# Patient Record
Sex: FEMALE | Race: WHITE | NOT HISPANIC OR LATINO | Employment: UNEMPLOYED | URBAN - METROPOLITAN AREA
[De-identification: names, ages, dates, MRNs, and addresses within clinical notes are randomized per-mention and may not be internally consistent; named-entity substitution may affect disease eponyms.]

---

## 2018-07-01 ENCOUNTER — HOSPITAL ENCOUNTER (OUTPATIENT)
Facility: MEDICAL CENTER | Age: 53
End: 2018-07-02
Attending: EMERGENCY MEDICINE | Admitting: INTERNAL MEDICINE
Payer: MEDICAID

## 2018-07-01 ENCOUNTER — APPOINTMENT (OUTPATIENT)
Dept: RADIOLOGY | Facility: MEDICAL CENTER | Age: 53
End: 2018-07-01
Attending: EMERGENCY MEDICINE
Payer: MEDICAID

## 2018-07-01 DIAGNOSIS — R40.4 ALTERED LEVEL OF CONSCIOUSNESS: ICD-10-CM

## 2018-07-01 LAB
ALBUMIN SERPL BCP-MCNC: 4 G/DL (ref 3.2–4.9)
ALBUMIN/GLOB SERPL: 1.1 G/DL
ALP SERPL-CCNC: 71 U/L (ref 30–99)
ALT SERPL-CCNC: 20 U/L (ref 2–50)
AMPHETAMINES UR QL: NEGATIVE
ANION GAP SERPL CALC-SCNC: 9 MMOL/L (ref 0–11.9)
APPEARANCE UR: CLEAR
AST SERPL-CCNC: 33 U/L (ref 12–45)
BACTERIA #/AREA URNS HPF: ABNORMAL /HPF
BARBITURATES UR QL SCN: NEGATIVE
BASOPHILS # BLD AUTO: 0.7 % (ref 0–1.8)
BASOPHILS # BLD: 0.07 K/UL (ref 0–0.12)
BENZODIAZ UR QL SCN: NEGATIVE
BILIRUB SERPL-MCNC: 0.5 MG/DL (ref 0.1–1.5)
BILIRUB UR QL STRIP.AUTO: NEGATIVE
BUN SERPL-MCNC: 20 MG/DL (ref 8–22)
BZE UR QL SCN: NEGATIVE
CALCIUM SERPL-MCNC: 9.1 MG/DL (ref 8.4–10.2)
CHLORIDE SERPL-SCNC: 108 MMOL/L (ref 96–112)
CHOLEST SERPL-MCNC: 207 MG/DL (ref 100–199)
CO2 SERPL-SCNC: 21 MMOL/L (ref 20–33)
COLOR UR: YELLOW
CREAT SERPL-MCNC: 1.12 MG/DL (ref 0.5–1.4)
EOSINOPHIL # BLD AUTO: 0.09 K/UL (ref 0–0.51)
EOSINOPHIL NFR BLD: 0.8 % (ref 0–6.9)
EPI CELLS #/AREA URNS HPF: ABNORMAL /HPF
ERYTHROCYTE [DISTWIDTH] IN BLOOD BY AUTOMATED COUNT: 41 FL (ref 35.9–50)
ETHANOL BLD-MCNC: 0.01 G/DL
GLOBULIN SER CALC-MCNC: 3.5 G/DL (ref 1.9–3.5)
GLUCOSE SERPL-MCNC: 140 MG/DL (ref 65–99)
GLUCOSE UR STRIP.AUTO-MCNC: NEGATIVE MG/DL
HCT VFR BLD AUTO: 41 % (ref 37–47)
HDLC SERPL-MCNC: 44 MG/DL
HGB BLD-MCNC: 13.8 G/DL (ref 12–16)
IMM GRANULOCYTES # BLD AUTO: 0.03 K/UL (ref 0–0.11)
IMM GRANULOCYTES NFR BLD AUTO: 0.3 % (ref 0–0.9)
KETONES UR STRIP.AUTO-MCNC: NEGATIVE MG/DL
LDLC SERPL CALC-MCNC: 139 MG/DL
LEUKOCYTE ESTERASE UR QL STRIP.AUTO: NEGATIVE
LYMPHOCYTES # BLD AUTO: 3.47 K/UL (ref 1–4.8)
LYMPHOCYTES NFR BLD: 32.3 % (ref 22–41)
MAGNESIUM SERPL-MCNC: 2.2 MG/DL (ref 1.5–2.5)
MCH RBC QN AUTO: 29.1 PG (ref 27–33)
MCHC RBC AUTO-ENTMCNC: 33.7 G/DL (ref 33.6–35)
MCV RBC AUTO: 86.3 FL (ref 81.4–97.8)
MICRO URNS: ABNORMAL
MONOCYTES # BLD AUTO: 0.68 K/UL (ref 0–0.85)
MONOCYTES NFR BLD AUTO: 6.3 % (ref 0–13.4)
NEUTROPHILS # BLD AUTO: 6.4 K/UL (ref 2–7.15)
NEUTROPHILS NFR BLD: 59.6 % (ref 44–72)
NITRITE UR QL STRIP.AUTO: NEGATIVE
NRBC # BLD AUTO: 0 K/UL
NRBC BLD-RTO: 0 /100 WBC
PCP UR QL SCN: NEGATIVE
PH UR STRIP.AUTO: 5.5 [PH]
PLATELET # BLD AUTO: 311 K/UL (ref 164–446)
PMV BLD AUTO: 10.2 FL (ref 9–12.9)
POTASSIUM SERPL-SCNC: 3.9 MMOL/L (ref 3.6–5.5)
PROT SERPL-MCNC: 7.5 G/DL (ref 6–8.2)
PROT UR QL STRIP: 100 MG/DL
RBC # BLD AUTO: 4.75 M/UL (ref 4.2–5.4)
RBC # URNS HPF: ABNORMAL /HPF
RBC UR QL AUTO: ABNORMAL
SODIUM SERPL-SCNC: 138 MMOL/L (ref 135–145)
SP GR UR REFRACTOMETRY: 1.03
TRIGL SERPL-MCNC: 118 MG/DL (ref 0–149)
TROPONIN I SERPL-MCNC: <0.02 NG/ML (ref 0–0.04)
UR OPIATES 2659: NEGATIVE
UR THC 2511T: NEGATIVE
UR TRICYCLIC 2660: NEGATIVE
WBC # BLD AUTO: 10.7 K/UL (ref 4.8–10.8)
WBC #/AREA URNS HPF: ABNORMAL /HPF

## 2018-07-01 PROCEDURE — 99220 PR INITIAL OBSERVATION CARE,LEVL III: CPT | Performed by: INTERNAL MEDICINE

## 2018-07-01 PROCEDURE — 83735 ASSAY OF MAGNESIUM: CPT

## 2018-07-01 PROCEDURE — 80061 LIPID PANEL: CPT

## 2018-07-01 PROCEDURE — G0378 HOSPITAL OBSERVATION PER HR: HCPCS

## 2018-07-01 PROCEDURE — 81001 URINALYSIS AUTO W/SCOPE: CPT | Mod: XU

## 2018-07-01 PROCEDURE — 70450 CT HEAD/BRAIN W/O DYE: CPT

## 2018-07-01 PROCEDURE — 80307 DRUG TEST PRSMV CHEM ANLYZR: CPT

## 2018-07-01 PROCEDURE — 80305 DRUG TEST PRSMV DIR OPT OBS: CPT

## 2018-07-01 PROCEDURE — 80053 COMPREHEN METABOLIC PANEL: CPT

## 2018-07-01 PROCEDURE — 99285 EMERGENCY DEPT VISIT HI MDM: CPT

## 2018-07-01 PROCEDURE — 700102 HCHG RX REV CODE 250 W/ 637 OVERRIDE(OP): Performed by: INTERNAL MEDICINE

## 2018-07-01 PROCEDURE — 85025 COMPLETE CBC W/AUTO DIFF WBC: CPT

## 2018-07-01 PROCEDURE — 83036 HEMOGLOBIN GLYCOSYLATED A1C: CPT

## 2018-07-01 PROCEDURE — 84443 ASSAY THYROID STIM HORMONE: CPT

## 2018-07-01 PROCEDURE — 71045 X-RAY EXAM CHEST 1 VIEW: CPT

## 2018-07-01 PROCEDURE — A9270 NON-COVERED ITEM OR SERVICE: HCPCS | Performed by: INTERNAL MEDICINE

## 2018-07-01 PROCEDURE — 84484 ASSAY OF TROPONIN QUANT: CPT

## 2018-07-01 RX ORDER — ASPIRIN 81 MG/1
324 TABLET, CHEWABLE ORAL DAILY
Status: DISCONTINUED | OUTPATIENT
Start: 2018-07-01 | End: 2018-07-03 | Stop reason: HOSPADM

## 2018-07-01 RX ORDER — AMOXICILLIN 250 MG
2 CAPSULE ORAL 2 TIMES DAILY
Status: DISCONTINUED | OUTPATIENT
Start: 2018-07-01 | End: 2018-07-03 | Stop reason: HOSPADM

## 2018-07-01 RX ORDER — ACETAMINOPHEN 325 MG/1
650 TABLET ORAL EVERY 6 HOURS PRN
Status: DISCONTINUED | OUTPATIENT
Start: 2018-07-01 | End: 2018-07-03 | Stop reason: HOSPADM

## 2018-07-01 RX ORDER — ASPIRIN 600 MG/1
300 SUPPOSITORY RECTAL DAILY
Status: DISCONTINUED | OUTPATIENT
Start: 2018-07-01 | End: 2018-07-03 | Stop reason: HOSPADM

## 2018-07-01 RX ORDER — BISACODYL 10 MG
10 SUPPOSITORY, RECTAL RECTAL
Status: DISCONTINUED | OUTPATIENT
Start: 2018-07-01 | End: 2018-07-03 | Stop reason: HOSPADM

## 2018-07-01 RX ORDER — POLYETHYLENE GLYCOL 3350 17 G/17G
1 POWDER, FOR SOLUTION ORAL
Status: DISCONTINUED | OUTPATIENT
Start: 2018-07-01 | End: 2018-07-03 | Stop reason: HOSPADM

## 2018-07-01 RX ORDER — PROMETHAZINE HYDROCHLORIDE 25 MG/1
12.5-25 TABLET ORAL EVERY 4 HOURS PRN
Status: DISCONTINUED | OUTPATIENT
Start: 2018-07-01 | End: 2018-07-02

## 2018-07-01 RX ORDER — PROMETHAZINE HYDROCHLORIDE 25 MG/1
12.5-25 SUPPOSITORY RECTAL EVERY 4 HOURS PRN
Status: DISCONTINUED | OUTPATIENT
Start: 2018-07-01 | End: 2018-07-02

## 2018-07-01 RX ORDER — ONDANSETRON 4 MG/1
4 TABLET, ORALLY DISINTEGRATING ORAL EVERY 4 HOURS PRN
Status: DISCONTINUED | OUTPATIENT
Start: 2018-07-01 | End: 2018-07-03 | Stop reason: HOSPADM

## 2018-07-01 RX ORDER — ONDANSETRON 2 MG/ML
4 INJECTION INTRAMUSCULAR; INTRAVENOUS EVERY 4 HOURS PRN
Status: DISCONTINUED | OUTPATIENT
Start: 2018-07-01 | End: 2018-07-03 | Stop reason: HOSPADM

## 2018-07-01 RX ORDER — ASPIRIN 325 MG
325 TABLET ORAL DAILY
Status: DISCONTINUED | OUTPATIENT
Start: 2018-07-01 | End: 2018-07-03 | Stop reason: HOSPADM

## 2018-07-01 RX ADMIN — ASPIRIN 325 MG ORAL TABLET 325 MG: 325 PILL ORAL at 23:53

## 2018-07-01 RX ADMIN — SENNOSIDES AND DOCUSATE SODIUM 2 TABLET: 8.6; 5 TABLET ORAL at 23:53

## 2018-07-01 ASSESSMENT — PAIN SCALES - GENERAL: PAINLEVEL_OUTOF10: 0

## 2018-07-02 ENCOUNTER — APPOINTMENT (OUTPATIENT)
Dept: RADIOLOGY | Facility: MEDICAL CENTER | Age: 53
End: 2018-07-02
Attending: INTERNAL MEDICINE
Payer: MEDICAID

## 2018-07-02 VITALS
RESPIRATION RATE: 18 BRPM | WEIGHT: 190.48 LBS | TEMPERATURE: 99.2 F | SYSTOLIC BLOOD PRESSURE: 153 MMHG | HEIGHT: 59 IN | HEART RATE: 78 BPM | BODY MASS INDEX: 38.4 KG/M2 | OXYGEN SATURATION: 93 % | DIASTOLIC BLOOD PRESSURE: 71 MMHG

## 2018-07-02 PROBLEM — E78.5 HYPERLIPIDEMIA: Status: ACTIVE | Noted: 2018-07-02

## 2018-07-02 PROBLEM — R46.2 BIZARRE BEHAVIOR: Status: ACTIVE | Noted: 2018-07-02

## 2018-07-02 PROBLEM — R51.9 HEADACHE: Status: ACTIVE | Noted: 2018-07-02

## 2018-07-02 PROBLEM — R73.9 HYPERGLYCEMIA: Status: ACTIVE | Noted: 2018-07-02

## 2018-07-02 PROBLEM — H91.92 HEARING LOSS OF LEFT EAR: Status: ACTIVE | Noted: 2018-07-02

## 2018-07-02 PROBLEM — F23 ACUTE PSYCHOSIS (HCC): Status: ACTIVE | Noted: 2018-07-02

## 2018-07-02 PROBLEM — E03.9 HYPOTHYROIDISM: Status: ACTIVE | Noted: 2018-07-02

## 2018-07-02 LAB
ERYTHROCYTE [SEDIMENTATION RATE] IN BLOOD BY WESTERGREN METHOD: 28 MM/HOUR (ref 0–30)
EST. AVERAGE GLUCOSE BLD GHB EST-MCNC: 126 MG/DL
HBA1C MFR BLD: 6 % (ref 0–5.6)
T4 FREE SERPL-MCNC: 0.92 NG/DL (ref 0.58–1.64)
TSH SERPL DL<=0.005 MIU/L-ACNC: 7.02 UIU/ML (ref 0.38–5.33)

## 2018-07-02 PROCEDURE — G0378 HOSPITAL OBSERVATION PER HR: HCPCS

## 2018-07-02 PROCEDURE — 700111 HCHG RX REV CODE 636 W/ 250 OVERRIDE (IP): Performed by: HOSPITALIST

## 2018-07-02 PROCEDURE — 700105 HCHG RX REV CODE 258: Performed by: HOSPITALIST

## 2018-07-02 PROCEDURE — A9270 NON-COVERED ITEM OR SERVICE: HCPCS | Performed by: HOSPITALIST

## 2018-07-02 PROCEDURE — 84439 ASSAY OF FREE THYROXINE: CPT

## 2018-07-02 PROCEDURE — 94760 N-INVAS EAR/PLS OXIMETRY 1: CPT

## 2018-07-02 PROCEDURE — 85652 RBC SED RATE AUTOMATED: CPT

## 2018-07-02 PROCEDURE — 99223 1ST HOSP IP/OBS HIGH 75: CPT | Performed by: HOSPITALIST

## 2018-07-02 PROCEDURE — 95951 EEG: CPT | Mod: 52

## 2018-07-02 PROCEDURE — 700102 HCHG RX REV CODE 250 W/ 637 OVERRIDE(OP): Performed by: HOSPITALIST

## 2018-07-02 PROCEDURE — 96365 THER/PROPH/DIAG IV INF INIT: CPT

## 2018-07-02 RX ORDER — AMOXICILLIN 250 MG
2 CAPSULE ORAL 2 TIMES DAILY
Status: CANCELLED | OUTPATIENT
Start: 2018-07-02

## 2018-07-02 RX ORDER — ONDANSETRON 4 MG/1
4 TABLET, ORALLY DISINTEGRATING ORAL EVERY 4 HOURS PRN
Status: CANCELLED | OUTPATIENT
Start: 2018-07-02

## 2018-07-02 RX ORDER — ONDANSETRON 2 MG/ML
4 INJECTION INTRAMUSCULAR; INTRAVENOUS EVERY 4 HOURS PRN
Status: CANCELLED | OUTPATIENT
Start: 2018-07-02

## 2018-07-02 RX ORDER — BISACODYL 10 MG
10 SUPPOSITORY, RECTAL RECTAL
Status: CANCELLED | OUTPATIENT
Start: 2018-07-02

## 2018-07-02 RX ORDER — QUETIAPINE FUMARATE 25 MG/1
25 TABLET, FILM COATED ORAL EVERY 8 HOURS
Status: DISCONTINUED | OUTPATIENT
Start: 2018-07-02 | End: 2018-07-02

## 2018-07-02 RX ORDER — ACETAMINOPHEN 325 MG/1
650 TABLET ORAL EVERY 6 HOURS PRN
Status: CANCELLED | OUTPATIENT
Start: 2018-07-02

## 2018-07-02 RX ORDER — POLYETHYLENE GLYCOL 3350 17 G/17G
1 POWDER, FOR SOLUTION ORAL
Status: CANCELLED | OUTPATIENT
Start: 2018-07-02

## 2018-07-02 RX ADMIN — QUETIAPINE FUMARATE 25 MG: 25 TABLET ORAL at 10:37

## 2018-07-02 RX ADMIN — ACYCLOVIR SODIUM 864 MG: 500 INJECTION, SOLUTION INTRAVENOUS at 17:49

## 2018-07-02 ASSESSMENT — LIFESTYLE VARIABLES
EVER_SMOKED: NEVER
ALCOHOL_USE: NO
EVER_SMOKED: NEVER

## 2018-07-02 ASSESSMENT — PAIN SCALES - WONG BAKER: WONGBAKER_NUMERICALRESPONSE: DOESN'T HURT AT ALL

## 2018-07-02 ASSESSMENT — COGNITIVE AND FUNCTIONAL STATUS - GENERAL
SUGGESTED CMS G CODE MODIFIER DAILY ACTIVITY: CH
SUGGESTED CMS G CODE MODIFIER MOBILITY: CH
DAILY ACTIVITIY SCORE: 24
MOBILITY SCORE: 24

## 2018-07-02 ASSESSMENT — PAIN SCALES - GENERAL
PAINLEVEL_OUTOF10: 0

## 2018-07-02 ASSESSMENT — PATIENT HEALTH QUESTIONNAIRE - PHQ9
2. FEELING DOWN, DEPRESSED, IRRITABLE, OR HOPELESS: NOT AT ALL
SUM OF ALL RESPONSES TO PHQ9 QUESTIONS 1 AND 2: 0
2. FEELING DOWN, DEPRESSED, IRRITABLE, OR HOPELESS: NOT AT ALL
SUM OF ALL RESPONSES TO PHQ9 QUESTIONS 1 AND 2: 0
SUM OF ALL RESPONSES TO PHQ9 QUESTIONS 1 AND 2: 0
1. LITTLE INTEREST OR PLEASURE IN DOING THINGS: NOT AT ALL
2. FEELING DOWN, DEPRESSED, IRRITABLE, OR HOPELESS: NOT AT ALL
1. LITTLE INTEREST OR PLEASURE IN DOING THINGS: NOT AT ALL
1. LITTLE INTEREST OR PLEASURE IN DOING THINGS: NOT AT ALL

## 2018-07-02 ASSESSMENT — ENCOUNTER SYMPTOMS
HALLUCINATIONS: 1
HEADACHES: 1

## 2018-07-02 NOTE — DISCHARGE PLANNING
Per MD, pt will need to transfer to [facility] for an MRI and LP under anesthesia.     SHAZIA obtained consent to transfer pt from pt's dtr, Nia.  She signed the COBRA form.     SHAZIA spoke to APOLINAR Quigley and she informed SW that there are no private beds on neuro.     SHAZIA sent PCS form to [facility] Chanda to place at will call.

## 2018-07-02 NOTE — H&P
Hospital Medicine History & Physical Note    Date of Service  7/2/2018    Primary Care Physician  No primary care provider on file.    Consultants  PSYCHIATRY    Code Status  FULL    Chief Complaint  none- patient is altered.     History of Presenting Illness  53 y.o. female who presented 7/1/2018 with acute confusion. Please see h/p from [facility] admission for more details. She has been intermittently confused and aggressive towards her family for the last week. Behaviors consist of a blank stare at times to very aggressive behavior towards her grandchild with comments that she is possessed wit demons. She has been telling family that she has snakes in her head and having visual and auditory hallucinations. She has apparently been reporting left sided hearing loss. She was admitted to [facility] and was unable to complete an MRI 2/2 severe combative behavior. She needds an MRI and an LP under anesthesia and kaitlin stringer transferred to [facility] for this. She was given one dose of seroquel and had marked sedation with just a 25mg dose thus this will be held for now. She will be seen by psychiatry as ordered but I am concerned that she has acute encephalitis. Empiric acyclovir has been started. Serum west nile has been ordered. She may need a neurology consultation and possible ID consultation depending on LP and MRI results.      Review of Systems  Review of Systems   Unable to perform ROS: Mental acuity       Past Medical History   has no past medical history on file.  Hypothyroid    Surgical History   has no past surgical history on file.     Family History  family history includes Lung Disease in her mother; Psychiatry in her mother.     Social History   reports that she has never smoked. She has never used smokeless tobacco. She reports that she does not drink alcohol or use drugs.  Confirmed with family that patient has not been using substances.     Allergies  No Known  Allergies    Medications  None   Synthroid      Physical Exam  Blood Pressure: 138/58   Temperature: 36.9 °C (98.4 °F)   Pulse: 88   Respiration: 18   Pulse Oximetry: 97 %     Physical Exam   Constitutional: She appears well-developed and well-nourished. No distress.   Patient seen and examined  Discussed plan with RN   HENT:   Right Ear: External ear normal.   Left Ear: External ear normal.   Nose: Nose normal.   Eyes: Conjunctivae are normal. Right eye exhibits no discharge. Left eye exhibits no discharge.   Neck: No JVD present.   Cardiovascular: Regular rhythm and normal heart sounds.    No murmur heard.  Cap refill 2sec  Pulses 2+ throughout     Pulmonary/Chest: Effort normal and breath sounds normal. No stridor. No respiratory distress. She has no wheezes. She has no rales.   Abdominal: Soft. Bowel sounds are normal. She exhibits no distension. There is no tenderness.   Musculoskeletal: She exhibits no edema or tenderness.   Neurological:   Somnolent at the time of my exam but when awakened she is combative and hallucinating. Unable to do a full neuro exam but does not appear to be focal.    Skin: Skin is warm and dry. She is not diaphoretic. No erythema.   Normal skin  Color.    Psychiatric: Her affect is labile and inappropriate. She is agitated, aggressive, hyperactive, actively hallucinating and combative. Cognition and memory are impaired. She expresses impulsivity and inappropriate judgment.   Hallucinating, combative.  She is inattentive.   Nursing note and vitals reviewed.      Laboratory:  Recent Labs      07/01/18 2013   WBC  10.7   RBC  4.75   HEMOGLOBIN  13.8   HEMATOCRIT  41.0   MCV  86.3   MCH  29.1   MCHC  33.7   RDW  41.0   PLATELETCT  311   MPV  10.2     Recent Labs      07/01/18 2013   SODIUM  138   POTASSIUM  3.9   CHLORIDE  108   CO2  21   GLUCOSE  140*   BUN  20   CREATININE  1.12   CALCIUM  9.1     Recent Labs      07/01/18 2013   ALTSGPT  20   ASTSGOT  33   ALKPHOSPHAT  71    TBILIRUBIN  0.5   GLUCOSE  140*             Recent Labs      07/01/18 2013   TRIGLYCERIDE  118   HDL  44   LDL  139*     Lab Results   Component Value Date    TROPONINI <0.02 07/01/2018       Urinalysis:    Lab Results   Component Value Date    SPECGRAVITY 1.028 07/01/2018    GLUCOSEUR Negative 07/01/2018    KETONES Negative 07/01/2018    NITRITE Negative 07/01/2018    WBCURINE 5-10 (A) 07/01/2018    RBCURINE  (A) 07/01/2018    BACTERIA Few (A) 07/01/2018    EPITHELCELL Few 07/01/2018        Imaging:  DX-CHEST-LIMITED (1 VIEW)   Final Result      No acute cardiopulmonary disease.      CT-HEAD W/O   Final Result      No evidence of acute intracranial process.      IR-CONSULT AND TREAT    (Results Pending)         Assessment/Plan:  I anticipate this patient will require at least two midnights for appropriate medical management, necessitating inpatient admission.    Acute psychosis- (present on admission)   Assessment & Plan    Concerning for acute encephalitis vs new acute psychosis,   Less likely stroke, brain mass and nonconvulsive seizures  Needs LP and MRI brain - transfer to [facility] hospital for these under anesthesia  EEG done here with pending results  Pending psych consult but need to rule out organic brain disease first  Tried one dose of seroquel and would avoid this as extremely sedating for patient.   Empiric acyclovir started  Serum [place] ordered          Hypothyroidism   Assessment & Plan    synthroid        Hyperglycemia   Assessment & Plan    Suspect reactive. Will trend.         Hyperlipidemia- (present on admission)   Assessment & Plan    Not on therapy.         Headache- (present on admission)   Assessment & Plan    Workup as above. Trend neuro exams.         Hearing loss of left ear- (present on admission)   Assessment & Plan    MRI brain and LP pending  Empiric acyclovir  Difficult to appreciate this on exam 2/2 delirium ;            VTE prophylaxis: heparin

## 2018-07-02 NOTE — PROGRESS NOTES
Breakfast provided for pt  Pt passed swallow eval per night nurse  Daughter at bedside helping to feed pt  Pt refusing to swallow food and holding orange juice in her mouth refusing to swallow

## 2018-07-02 NOTE — CARE PLAN
Problem: Safety  Goal: Will remain free from injury  Outcome: PROGRESSING AS EXPECTED  Check on Pt hourly. Pt encouraged to call for assistance as needed. Bed in low position, upper side rails up, anti slippery socks on, call light within reach, bed alarm on. Family in room with Pt.    Problem: Knowledge Deficit  Goal: Knowledge of disease process/condition, treatment plan, diagnostic tests, and medications will improve  Outcome: PROGRESSING AS EXPECTED  Discuss POC with Pt. Assess Pt's knowledge of disease process, treatment plan, diagnostic test, labs, and medications; and explain and give information as needed.

## 2018-07-02 NOTE — CARE PLAN
Problem: Safety  Goal: Will remain free from injury    Intervention: Provide assistance with mobility   07/02/18 1253   OTHER   Assistance / Tolerance Standby Assist;Assistance of One;Tolerates Well

## 2018-07-02 NOTE — ED NOTES
Pt given water per request.     .Pt prepared for transport to acute floor. Pt leaves ER in stable condition and with all belongings.

## 2018-07-02 NOTE — ASSESSMENT & PLAN NOTE
Concerning for acute encephalitis vs new acute psychosis,   Less likely stroke, brain mass and nonconvulsive seizures  Needs LP and MRI brain - transfer to main hospital for these under anesthesia  EEG done here with pending results  Pending psych consult but need to rule out organic brain disease first  Tried one dose of seroquel and would avoid this as extremely sedating for patient.   Empiric acyclovir started  Serum west nile ordered
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
## 2018-07-02 NOTE — ED NOTES
Pt's son Immanuel called requesting information be given. Not on file as emergency contact. Son notified to contact family members. Son yells and curses at RN and hangs up phone.

## 2018-07-02 NOTE — PROGRESS NOTES
Pt had a new transient episode of AMS. Pt had a blank stare for just a couple of minutes, did not answered questions at that time. As per daughter, Pt was lifting the legs up and down in a repetitive pattern. After those couple of minutes the Pt answered questions, she was A&O to self and place, can tell the year but not month or day. Will continue monitoring her. Safety measures in place.

## 2018-07-02 NOTE — PROGRESS NOTES
Direct admit from [facility], Dr. BARBARA Aguilar for ALOC, loss of memory, acute psychosis.  Discharge and readmit orders signed and held, need to be released upon pt arrival.  Pt coming by ground.

## 2018-07-02 NOTE — CARE PLAN
Problem: Communication  Goal: The ability to communicate needs accurately and effectively will improve    Intervention: Joffre patient and significant other/support system to call light to alert staff of needs   07/02/18 4050   OTHER   Oriented to: All of the Following : Location of Bathroom, Visiting Policy, Unit Routine, Call Light and Bedside Controls, Bedside Rail Policy, Smoking Policy, Rights and Responsibilities, Bedside Report, and Patient Education Notebook

## 2018-07-02 NOTE — DISCHARGE SUMMARY
Discharge Summary    CHIEF COMPLAINT ON ADMISSION  Chief Complaint   Patient presents with   • Memory Loss       Reason for Admission  Loss of Memory     CODE STATUS  Full Code    HPI & HOSPITAL COURSE  This is a 53 y.o. female here with acute alteration in mental status. Please see h/p for details. She needs an LP and MRI under anesthesia and will be transferred to [facility] for this. She will be seen by psychiatry there and may need a neurology consultation. She will be treated with empiric acyclovir for now.              Therefore, she is discharged in fair and stable condition to a short-term general hosptial for inpatient care.            FOLLOW UP ITEMS POST DISCHARGE  none    DISCHARGE DIAGNOSES  Active Problems:    Acute psychosis POA: Yes    Hearing loss of left ear POA: Yes    Headache POA: Yes    Hyperlipidemia POA: Yes    Hyperglycemia POA: Unknown    Hypothyroidism POA: Unknown  Resolved Problems:    * No resolved hospital problems. *      FOLLOW UP  No future appointments.  No follow-up provider specified.    MEDICATIONS ON DISCHARGE     Medication List      You have not been prescribed any medications.         Allergies  No Known Allergies    DIET  Orders Placed This Encounter   Procedures   • Diet Order Regular     Standing Status:   Standing     Number of Occurrences:   1     Order Specific Question:   Diet:     Answer:   Regular [1]       ACTIVITY  As tolerated.  Weight bearing as tolerated    LINES, DRAINS, AND WOUNDS  This is an automated list. Peripheral IVs will be removed prior to discharge.  PIV Group Right Hand 20g Flexible Catheter (Active)   Line Secured Taped;Transparent 7/2/2018 12:07 AM   Site Condition / Description Assessed;Patent;Clean;Dry;Intact 7/2/2018 12:07 AM   Dressing Type / Description Transparent;Clean;Dry;Intact 7/2/2018 12:07 AM   Dressing Status Observed 7/2/2018  9:00 AM   Saline Locked Yes 7/2/2018  9:00 AM   [facility]fil[facility] Grading [facility] 0  7/2/2018  9:00 AM   Phlebitis Scale (Used by [facility]) 0 7/2/2018  9:00 AM                     MENTAL STATUS ON TRANSFER  Level of Consciousness: Alert  Orientation : Oriented x 4  Speech: Speech Clear    CONSULTATIONS  psychiatry    PROCEDURES  none    LABORATORY  Lab Results   Component Value Date    SODIUM 138 07/01/2018    POTASSIUM 3.9 07/01/2018    CHLORIDE 108 07/01/2018    CO2 21 07/01/2018    GLUCOSE 140 (H) 07/01/2018    BUN 20 07/01/2018    CREATININE 1.12 07/01/2018        Lab Results   Component Value Date    WBC 10.7 07/01/2018    HEMOGLOBIN 13.8 07/01/2018    HEMATOCRIT 41.0 07/01/2018    PLATELETCT 311 07/01/2018        Total time of the discharge process exceeds 34 minutes.
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
## 2018-07-02 NOTE — PROGRESS NOTES
Admitted Pt from ER  via gursriram. Pt awake, A&O 4. Accompanied by vikas.  Transferred to bed and assessment performed. Oriented Pt to room and discussed POC. Initiated safety measures.

## 2018-07-02 NOTE — ED NOTES
"RN took pt to bathroom to collect urine sample. Pt back to bedroom and RN went to hook pt back up to monitor. Pt looked at RN with blank stare and would not answer any questions. RN went to label pt urine while still in room and pt was staring at RN with same blank stare. ERP to the bedside to examine pt. Pt gripping side rail tightly but would only stare at ERP. Pt non verbal. Pt son in law called out to her and she shook her head and began answering questions. RN took urine to lab and on way back to RN station pt left room and came at RN with \"blank stare.\" Pt redirected with saul voice and went back to room. RN consulted with ERP and decided to get security involved. Security to the bedside with RN to help attach pt back to monitor equipment.   "
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
## 2018-07-02 NOTE — ED PROVIDER NOTES
ED Provider Note    CHIEF COMPLAINT  Chief Complaint   Patient presents with   • Memory Loss       HPI  Vilma Foster is a 53 y.o. female here for evaluation of altered mentation.  The history was obtained from the son-in-law, who states that over the course of the last 2-3 weeks he has noticed that the patient has been confused, and intermittently aggressive.  It was reported that she was hugging her granddaughter, but continued to have her so tight that the parents had to then remove the patient's arms from the granddaughter.  The patient herself does not recall these types of events, but does become intermittently aggressive.  There is not been any type of trauma, chest pain, or shortness of breath.  This is very new behavior for this patient per the son-in-law, and there is no new medication changes or other factors.  Patient denies having any headache or any medical concerns at this time.  She does have a history of mental illness in the family.    PAST MEDICAL HISTORY   No diabetes  No hyperlipidemia    SOCIAL HISTORY  Social History     Social History Main Topics   • Smoking status: Never Smoker   • Smokeless tobacco: Never Used   • Alcohol use No   • Drug use: No   • Sexual activity: Not on file       SURGICAL HISTORY  patient denies any surgical history    CURRENT MEDICATIONS  Home Medications    **Home medications have not yet been reviewed for this encounter**         ALLERGIES  No Known Allergies    REVIEW OF SYSTEMS  See HPI for further details. Review of systems as above, otherwise all other systems are negative.     PHYSICAL EXAM  Constitutional: Well developed, well nourished.  Mild acute distress.  HEENT: atraumatic. Posterior pharynx clear and moist.  Eyes:  EOMI. Normal sclera.  Neck: Supple, Full range of motion, nontender.  Chest/Pulmonary: clear to ausculation. Symmetrical expansion.   Cardio: Regular rate and rhythm with no murmur.   Abdomen: Soft, nontender. No peritoneal signs. No  guarding. No palpable masses.  Back: No CVA tenderness, nontender midline, no step offs.  Musculoskeletal: No deformity, no edema, neurovascular intact.   Neuro: Clear speech, appropriate, cooperative, cranial nerves II-XII grossly intact.  Psych: Aggressive, and agitated intermittently.    Results for orders placed or performed during the hospital encounter of 07/01/18   CBC WITH DIFFERENTIAL   Result Value Ref Range    WBC 10.7 4.8 - 10.8 K/uL    RBC 4.75 4.20 - 5.40 M/uL    Hemoglobin 13.8 12.0 - 16.0 g/dL    Hematocrit 41.0 37.0 - 47.0 %    MCV 86.3 81.4 - 97.8 fL    MCH 29.1 27.0 - 33.0 pg    MCHC 33.7 33.6 - 35.0 g/dL    RDW 41.0 35.9 - 50.0 fL    Platelet Count 311 164 - 446 K/uL    MPV 10.2 9.0 - 12.9 fL    Neutrophils-Polys 59.60 44.00 - 72.00 %    Lymphocytes 32.30 22.00 - 41.00 %    Monocytes 6.30 0.00 - 13.40 %    Eosinophils 0.80 0.00 - 6.90 %    Basophils 0.70 0.00 - 1.80 %    Immature Granulocytes 0.30 0.00 - 0.90 %    Nucleated RBC 0.00 /100 WBC    Neutrophils (Absolute) 6.40 2.00 - 7.15 K/uL    Lymphs (Absolute) 3.47 1.00 - 4.80 K/uL    Monos (Absolute) 0.68 0.00 - 0.85 K/uL    Eos (Absolute) 0.09 0.00 - 0.51 K/uL    Baso (Absolute) 0.07 0.00 - 0.12 K/uL    Immature Granulocytes (abs) 0.03 0.00 - 0.11 K/uL    NRBC (Absolute) 0.00 K/uL   COMP METABOLIC PANEL   Result Value Ref Range    Sodium 138 135 - 145 mmol/L    Potassium 3.9 3.6 - 5.5 mmol/L    Chloride 108 96 - 112 mmol/L    Co2 21 20 - 33 mmol/L    Anion Gap 9.0 0.0 - 11.9    Glucose 140 (H) 65 - 99 mg/dL    Bun 20 8 - 22 mg/dL    Creatinine 1.12 0.50 - 1.40 mg/dL    Calcium 9.1 8.4 - 10.2 mg/dL    AST(SGOT) 33 12 - 45 U/L    ALT(SGPT) 20 2 - 50 U/L    Alkaline Phosphatase 71 30 - 99 U/L    Total Bilirubin 0.5 0.1 - 1.5 mg/dL    Albumin 4.0 3.2 - 4.9 g/dL    Total Protein 7.5 6.0 - 8.2 g/dL    Globulin 3.5 1.9 - 3.5 g/dL    A-G Ratio 1.1 g/dL   TROPONIN   Result Value Ref Range    Troponin I <0.02 0.00 - 0.04 ng/mL   UR DRUG SCREEN(SO [place]  ONLY)   Result Value Ref Range    Phencyclidine -Pcp Negative Negative    Benzodiazepines Negative Negative    Cocaine Metabolite Negative Negative    Amphetamines By Triage Negative Negative    Urine THC Negative Negative    Codeine-Morphine Negative Negative    Barbiturates Negative Negative    Tricyclic Antidepressants Negative Negative   DIAGNOSTIC ALCOHOL   Result Value Ref Range    Diagnostic Alcohol 0.01 (H) 0.00 g/dL   ESTIMATED GFR   Result Value Ref Range    GFR If African American >60 >60 mL/min/1.73 m 2    GFR If Non African American 50 (A) >60 mL/min/1.73 m 2   URINALYSIS,CULTURE IF INDICATED   Result Value Ref Range    Micro Urine Req Microscopic     Color Yellow     Character Clear     Ph 5.5 5.0 - 8.0    Glucose Negative Negative mg/dL    Ketones Negative Negative mg/dL    Protein 100 (A) Negative mg/dL    Bilirubin Negative Negative    Nitrite Negative Negative    Leukocyte Esterase Negative Negative    Occult Blood Large (A) Negative   REFRACTOMETER SG   Result Value Ref Range    Specific Gravity 1.028    URINE MICROSCOPIC (W/UA)   Result Value Ref Range    WBC 5-10 (A) /hpf    RBC  (A) /hpf    Bacteria Few (A) None /hpf    Epithelial Cells Few Few /hpf      DX-CHEST-LIMITED (1 VIEW)   Final Result      No acute cardiopulmonary disease.      CT-HEAD W/O   Final Result      No evidence of acute intracranial process.            PROCEDURES     MEDICAL RECORD  I have reviewed patient's medical record and pertinent results are listed above.    COURSE & MEDICAL DECISION MAKING  I have reviewed any medical record information, laboratory studies and radiographic results as noted above.    10:57 PM  The patient is nontoxic appearing, and afebrile.  However she has had intermittent episodes of agitation and aggressiveness, while being in the emergency department.  At this time I can find no cause for her altered mental status.  Im sure there is a psychiatric component to this that will show itself, but  for now, she will be admitted to Dr. Siegel for further evaluation.     Differential diagnoses include but not limited to: sah, subdural, uti, psychiatric illness,     FINAL IMPRESSION  1. Altered level of consciousness            Electronically signed by: Alex Mack, 7/1/2018 10:55 PM

## 2018-07-03 ENCOUNTER — HOSPITAL ENCOUNTER (INPATIENT)
Facility: MEDICAL CENTER | Age: 53
LOS: 8 days | DRG: 071 | End: 2018-07-11
Attending: HOSPITALIST | Admitting: HOSPITALIST
Payer: MEDICAID

## 2018-07-03 ENCOUNTER — APPOINTMENT (OUTPATIENT)
Dept: RADIOLOGY | Facility: MEDICAL CENTER | Age: 53
DRG: 071 | End: 2018-07-03
Attending: HOSPITALIST
Payer: MEDICAID

## 2018-07-03 DIAGNOSIS — G93.40 ACUTE ENCEPHALOPATHY: ICD-10-CM

## 2018-07-03 LAB
ANION GAP SERPL CALC-SCNC: 6 MMOL/L (ref 0–11.9)
BASOPHILS # BLD AUTO: 0.9 % (ref 0–1.8)
BASOPHILS # BLD: 0.07 K/UL (ref 0–0.12)
BUN SERPL-MCNC: 17 MG/DL (ref 8–22)
CALCIUM SERPL-MCNC: 9.2 MG/DL (ref 8.5–10.5)
CHLORIDE SERPL-SCNC: 107 MMOL/L (ref 96–112)
CO2 SERPL-SCNC: 27 MMOL/L (ref 20–33)
CREAT SERPL-MCNC: 1 MG/DL (ref 0.5–1.4)
EOSINOPHIL # BLD AUTO: 0.09 K/UL (ref 0–0.51)
EOSINOPHIL NFR BLD: 1.2 % (ref 0–6.9)
ERYTHROCYTE [DISTWIDTH] IN BLOOD BY AUTOMATED COUNT: 42.3 FL (ref 35.9–50)
ERYTHROCYTE [SEDIMENTATION RATE] IN BLOOD BY WESTERGREN METHOD: 32 MM/HOUR (ref 0–30)
GLUCOSE SERPL-MCNC: 95 MG/DL (ref 65–99)
HCT VFR BLD AUTO: 41.5 % (ref 37–47)
HGB BLD-MCNC: 13.4 G/DL (ref 12–16)
IMM GRANULOCYTES # BLD AUTO: 0.06 K/UL (ref 0–0.11)
IMM GRANULOCYTES NFR BLD AUTO: 0.8 % (ref 0–0.9)
LYMPHOCYTES # BLD AUTO: 2.45 K/UL (ref 1–4.8)
LYMPHOCYTES NFR BLD: 31.5 % (ref 22–41)
MCH RBC QN AUTO: 28.2 PG (ref 27–33)
MCHC RBC AUTO-ENTMCNC: 32.3 G/DL (ref 33.6–35)
MCV RBC AUTO: 87.2 FL (ref 81.4–97.8)
MONOCYTES # BLD AUTO: 0.42 K/UL (ref 0–0.85)
MONOCYTES NFR BLD AUTO: 5.4 % (ref 0–13.4)
NEUTROPHILS # BLD AUTO: 4.69 K/UL (ref 2–7.15)
NEUTROPHILS NFR BLD: 60.2 % (ref 44–72)
NRBC # BLD AUTO: 0 K/UL
NRBC BLD-RTO: 0 /100 WBC
PLATELET # BLD AUTO: 290 K/UL (ref 164–446)
PMV BLD AUTO: 10.4 FL (ref 9–12.9)
POTASSIUM SERPL-SCNC: 4 MMOL/L (ref 3.6–5.5)
RBC # BLD AUTO: 4.76 M/UL (ref 4.2–5.4)
SODIUM SERPL-SCNC: 140 MMOL/L (ref 135–145)
WBC # BLD AUTO: 7.8 K/UL (ref 4.8–10.8)

## 2018-07-03 PROCEDURE — 770001 HCHG ROOM/CARE - MED/SURG/GYN PRIV*

## 2018-07-03 PROCEDURE — 86694 HERPES SIMPLEX NES ANTBDY: CPT

## 2018-07-03 PROCEDURE — 700102 HCHG RX REV CODE 250 W/ 637 OVERRIDE(OP): Performed by: HOSPITALIST

## 2018-07-03 PROCEDURE — 86788 WEST NILE VIRUS AB IGM: CPT

## 2018-07-03 PROCEDURE — 85652 RBC SED RATE AUTOMATED: CPT

## 2018-07-03 PROCEDURE — 36415 COLL VENOUS BLD VENIPUNCTURE: CPT

## 2018-07-03 PROCEDURE — 86789 WEST NILE VIRUS ANTIBODY: CPT

## 2018-07-03 PROCEDURE — 80048 BASIC METABOLIC PNL TOTAL CA: CPT

## 2018-07-03 PROCEDURE — 86790 VIRUS ANTIBODY NOS: CPT

## 2018-07-03 PROCEDURE — 85025 COMPLETE CBC W/AUTO DIFF WBC: CPT

## 2018-07-03 PROCEDURE — 700105 HCHG RX REV CODE 258: Performed by: HOSPITALIST

## 2018-07-03 PROCEDURE — 84157 ASSAY OF PROTEIN OTHER: CPT

## 2018-07-03 PROCEDURE — 700111 HCHG RX REV CODE 636 W/ 250 OVERRIDE (IP): Performed by: HOSPITALIST

## 2018-07-03 PROCEDURE — A9270 NON-COVERED ITEM OR SERVICE: HCPCS | Performed by: HOSPITALIST

## 2018-07-03 PROCEDURE — 82945 GLUCOSE OTHER FLUID: CPT

## 2018-07-03 PROCEDURE — 99233 SBSQ HOSP IP/OBS HIGH 50: CPT | Performed by: HOSPITALIST

## 2018-07-03 RX ORDER — ACETAMINOPHEN 325 MG/1
650 TABLET ORAL EVERY 6 HOURS PRN
Status: DISCONTINUED | OUTPATIENT
Start: 2018-07-03 | End: 2018-07-11 | Stop reason: HOSPADM

## 2018-07-03 RX ORDER — AMOXICILLIN 250 MG
2 CAPSULE ORAL 2 TIMES DAILY
Status: DISCONTINUED | OUTPATIENT
Start: 2018-07-03 | End: 2018-07-10

## 2018-07-03 RX ORDER — ONDANSETRON 2 MG/ML
4 INJECTION INTRAMUSCULAR; INTRAVENOUS EVERY 4 HOURS PRN
Status: DISCONTINUED | OUTPATIENT
Start: 2018-07-03 | End: 2018-07-11 | Stop reason: HOSPADM

## 2018-07-03 RX ORDER — BISACODYL 10 MG
10 SUPPOSITORY, RECTAL RECTAL
Status: DISCONTINUED | OUTPATIENT
Start: 2018-07-03 | End: 2018-07-10

## 2018-07-03 RX ORDER — ONDANSETRON 4 MG/1
4 TABLET, ORALLY DISINTEGRATING ORAL EVERY 4 HOURS PRN
Status: DISCONTINUED | OUTPATIENT
Start: 2018-07-03 | End: 2018-07-11 | Stop reason: HOSPADM

## 2018-07-03 RX ORDER — SODIUM CHLORIDE 9 MG/ML
INJECTION, SOLUTION INTRAVENOUS CONTINUOUS
Status: ACTIVE | OUTPATIENT
Start: 2018-07-03 | End: 2018-07-03

## 2018-07-03 RX ORDER — POLYETHYLENE GLYCOL 3350 17 G/17G
1 POWDER, FOR SOLUTION ORAL
Status: DISCONTINUED | OUTPATIENT
Start: 2018-07-03 | End: 2018-07-10

## 2018-07-03 RX ADMIN — SODIUM CHLORIDE: 9 INJECTION, SOLUTION INTRAVENOUS at 02:28

## 2018-07-03 RX ADMIN — ACETAMINOPHEN 650 MG: 325 TABLET, FILM COATED ORAL at 15:19

## 2018-07-03 RX ADMIN — STANDARDIZED SENNA CONCENTRATE AND DOCUSATE SODIUM 2 TABLET: 8.6; 5 TABLET, FILM COATED ORAL at 08:59

## 2018-07-03 RX ADMIN — ACYCLOVIR SODIUM 455 MG: 500 INJECTION, SOLUTION INTRAVENOUS at 02:28

## 2018-07-03 RX ADMIN — ACYCLOVIR SODIUM 455 MG: 500 INJECTION, SOLUTION INTRAVENOUS at 18:14

## 2018-07-03 RX ADMIN — ACYCLOVIR SODIUM 455 MG: 500 INJECTION, SOLUTION INTRAVENOUS at 11:10

## 2018-07-03 ASSESSMENT — LIFESTYLE VARIABLES
ALCOHOL_USE: NO
EVER_SMOKED: NEVER

## 2018-07-03 ASSESSMENT — PAIN SCALES - GENERAL: PAINLEVEL_OUTOF10: 0

## 2018-07-03 NOTE — PROGRESS NOTES
Pt difficult to assess, lethargic. Flat affect, stares blankly when asked questions. Daughter at bedside, helpful to answer most questions. 1:1 sitter at bedside. Pt sleeping now. Hourly rounding in place.

## 2018-07-03 NOTE — PROGRESS NOTES
2100- Assessment complete. Pt has flat affect and refuses medications. Bed at [facility] assigned. Bed alarm is on. Call light is within reach. Sitter is in place.     2215- Report given to Josee JIANG at [facility]. Daughter of the pt wanted to ride with the pt in the ambulance to keep pt calm. Daughter and pt also would like to hold the IV medication until transported to [facility]. [facility] notified of pickup. Transport paper and legal 2000 paper work filled out.

## 2018-07-03 NOTE — PROGRESS NOTES
Beside report received from Pretty JIANG. Safety precautions in place. Call light within reach. Pt resting in bed.

## 2018-07-03 NOTE — CARE PLAN
Problem: Safety  Goal: Will remain free from falls    Intervention: Implement fall precautions  Bed alarm added to bed.      Problem: Venous Thromboembolism (VTW)/Deep Vein Thrombosis (DVT) Prevention:  Goal: Patient will participate in Venous Thrombosis (VTE)/Deep Vein Thrombosis (DVT)Prevention Measures    Intervention: Ensure patient wears graduated elastic stockings (TRAM hose) and/or SCDs, if ordered, when in bed or chair (Remove at least once per shift for skin check)  SCDs on

## 2018-07-03 NOTE — PROCEDURES
DATE OF SERVICE:    HISTORY OF PRESENT ILLNESS:  The patient is a 53-year-old female with a   history of episodic confusion and changes in behavior.  EEG is requested to   rule out underlying seizure activity.    CONDITION OF RECORDING:  This is a 21-channel, portable, digital video EEG   tracing of approximately 30-minute duration.  Bipolar and referential montages   are used.  Only photic stimulation is done.  The patient is briefly awake and   drowsy, but for the most part asleep for the tracing.  She is on Seroquel and   aspirin.    TRACING DESCRIPTION:  As the tracing begins, the patient is seen to be drowsy   and then achieved sleep where she remains essentially for most of the tracing.    The background consists of sleep spindles at very high amplitude, but also   vertex waves and K-complexes on one occasion, an isolated sharp discharge is   seen parasagittally.  No sustained electroencephalographic seizure activity is   seen at any time in association.  The background resumes normal rhythmic   pattern.    At the end of the tracing, the patient is awakened, posterior dominant rhythm   of 8 Hz is seen bilaterally during these portions of the tracing.  Photic   stimulation revealed little driving response, there was no activation.    IMPRESSION:  This is an electroencephalogram that is within the range of   normal for a patient of this age in the mostly asleep state, though there are   brief episodes of arousal and drowsiness.  A cause for the patient's confusion   could not be determined.  There is no underlying epileptogenic potential   documented.  Clinical correlation is suggested.       ____________________________________     MD JAE ARIAS / LEONARDO    DD:  07/02/2018 18:12:33  DT:  07/02/2018 18:24:35    D#:  [id number]  Job#:  [id number]

## 2018-07-03 NOTE — CARE PLAN
Problem: Safety  Goal: Will remain free from injury  Outcome: PROGRESSING AS EXPECTED  Bed locked and in lowest position. Call light and personal belongings in reach. 1:1 sitter at bedside. Bed alarm in place. Hourly rounding.     Problem: Venous Thromboembolism (VTW)/Deep Vein Thrombosis (DVT) Prevention:  Goal: Patient will participate in Venous Thrombosis (VTE)/Deep Vein Thrombosis (DVT)Prevention Measures  Outcome: PROGRESSING AS EXPECTED  SCDs in place.

## 2018-07-03 NOTE — PROGRESS NOTES
Pt transported by [facility]. All belonging gathered at departure. Tele D/C's. Pt tolerated well. No signs of distress. Daughter escorted in the ambulance.

## 2018-07-04 ENCOUNTER — APPOINTMENT (OUTPATIENT)
Dept: RADIOLOGY | Facility: MEDICAL CENTER | Age: 53
DRG: 071 | End: 2018-07-04
Attending: HOSPITALIST
Payer: MEDICAID

## 2018-07-04 PROCEDURE — 700102 HCHG RX REV CODE 250 W/ 637 OVERRIDE(OP): Performed by: HOSPITALIST

## 2018-07-04 PROCEDURE — 700111 HCHG RX REV CODE 636 W/ 250 OVERRIDE (IP): Performed by: HOSPITALIST

## 2018-07-04 PROCEDURE — 770001 HCHG ROOM/CARE - MED/SURG/GYN PRIV*

## 2018-07-04 PROCEDURE — 99232 SBSQ HOSP IP/OBS MODERATE 35: CPT | Performed by: HOSPITALIST

## 2018-07-04 PROCEDURE — A9270 NON-COVERED ITEM OR SERVICE: HCPCS | Performed by: HOSPITALIST

## 2018-07-04 PROCEDURE — 700105 HCHG RX REV CODE 258: Performed by: HOSPITALIST

## 2018-07-04 RX ADMIN — STANDARDIZED SENNA CONCENTRATE AND DOCUSATE SODIUM 2 TABLET: 8.6; 5 TABLET, FILM COATED ORAL at 07:49

## 2018-07-04 RX ADMIN — ACYCLOVIR SODIUM 455 MG: 500 INJECTION, SOLUTION INTRAVENOUS at 17:38

## 2018-07-04 RX ADMIN — ACYCLOVIR SODIUM 455 MG: 500 INJECTION, SOLUTION INTRAVENOUS at 03:59

## 2018-07-04 RX ADMIN — ACYCLOVIR SODIUM 455 MG: 500 INJECTION, SOLUTION INTRAVENOUS at 10:10

## 2018-07-04 NOTE — PROGRESS NOTES
Walked patient in the hallway. Patient makes it 100 feet from her bed when she stops and tries go into the MD dictation room. Attempted to reorient patient to plan of walking in the hallway. Patient then tries to sit on the floor. HOA Esparza grabs a swivel chair from the nurse's station to catch patient. We then wheel patient back to bed.

## 2018-07-04 NOTE — CARE PLAN
Problem: Venous Thromboembolism (VTW)/Deep Vein Thrombosis (DVT) Prevention:  Goal: Patient will participate in Venous Thrombosis (VTE)/Deep Vein Thrombosis (DVT)Prevention Measures    Intervention: Encourage ambulation/mobilization at level directed by Physical Therapy in collaboration with Interdisciplinary Team  Walked patient. See ADLs Flowsheet.      Problem: Fluid Volume:  Goal: Will maintain balanced intake and output    Intervention: Monitor, educate, and encourage compliance with therapeutic intake of liquids  Encouraging sips. See I's & O's Flowsheet.

## 2018-07-04 NOTE — PROGRESS NOTES
Assumed care of pt at 1900. Pt AAOx4, RA. Denies pain at this time. 1:1 sitter at bedside. Daughter Nia at bedside, updated on POC. Hourly rounding in place.

## 2018-07-04 NOTE — PROGRESS NOTES
[facility] Hospitalist Progress Note    Date of Service: 7/3/2018    Chief Complaint  53 y.o. female transferred from [facility]  7/3/2018 with Aloc psychosis, aggressive behavior to family, left hearing loss.     Interval Problem Update    Acute psychosis , agitation.  Unable to MRI initially due to severe agitation.   Daughter reports no hx drug, ,etoh abuse or psychiatric disorder.     Consultants/Specialty    Psychiatry     Disposition    TBD        Review of Systems   Unable to perform ROS: Medical condition   Constitutional:        Awake but doesn't want to answer questions.       Physical Exam  Laboratory/Imaging   Hemodynamics  Temp (24hrs), Av.6 °C (97.8 °F), Min:36.2 °C (97.2 °F), Max:36.7 °C (98 °F)   Temperature: 36.2 °C (97.2 °F)  Pulse  Av.6  Min: 66  Max: 77    Blood Pressure: 140/72      Respiratory      Respiration: 18, Pulse Oximetry: 95 %             Fluids    Intake/Output Summary (Last 24 hours) at 18 1720  Last data filed at 18 0400   Gross per 24 hour   Intake                0 ml   Output                0 ml   Net                0 ml       Nutrition  Orders Placed This Encounter   Procedures   • Diet Order Regular     Standing Status:   Standing     Number of Occurrences:   1     Order Specific Question:   Diet:     Answer:   Regular [1]     Physical Exam   Constitutional: No distress.   Flat affect, not answering questions.    HENT:   Head: Normocephalic and atraumatic.   Right Ear: External ear normal.   Left Ear: External ear normal.   Nose: Nose normal.   Eyes: EOM are normal. Right eye exhibits no discharge. Left eye exhibits no discharge. No scleral icterus.   Neck: Neck supple. No JVD present.   Cardiovascular: Normal rate and regular rhythm.    No murmur heard.  Pulmonary/Chest: Effort normal. No stridor. She has no wheezes. She has no rales.   Abdominal: Soft. Bowel sounds are normal. She exhibits no distension. There is no tenderness.   Musculoskeletal:  She exhibits no edema or tenderness.   Neurological: She is alert.   Limited- no gross focal weakness   Skin: Skin is warm and dry. She is not diaphoretic. No pallor.   Psychiatric: She has a normal mood and affect. Her behavior is normal.   Vitals reviewed.      Recent Labs      07/01/18 2013 07/03/18   0312   WBC  10.7  7.8   RBC  4.75  4.76   HEMOGLOBIN  13.8  13.4   HEMATOCRIT  41.0  41.5   MCV  86.3  87.2   MCH  29.1  28.2   MCHC  33.7  32.3*   RDW  41.0  42.3   PLATELETCT  311  290   MPV  10.2  10.4     Recent Labs      07/01/18 2013 07/03/18   0312   SODIUM  138  140   POTASSIUM  3.9  4.0   CHLORIDE  108  107   CO2  21  27   GLUCOSE  140*  95   BUN  20  17   CREATININE  1.12  1.00   CALCIUM  9.1  9.2             Recent Labs      07/01/18 2013   TRIGLYCERIDE  118   HDL  44   LDL  139*          Assessment/Plan     Acute psychosis- (present on admission)   Assessment & Plan    Concern for encephalitis, underlying stroke or mass, underlying psychiatric Do  Severe agitation - unable to do MRI , LP - plan IR to assist w anesthesia.   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   Fu MRI brain to eval for cerebritis, Mass, Cva.   Plan LP w Hsv pcr, wnv, cell ct, ,gram stain, protein, viral encephalitis workup and NMNDA antibodies   Discussed with Dr. Fitch neurology to consult.    Has episodes of paranoia against grandchildren and lacks capacity to make medical decisions - will continue legal hold.   LIfe skills input appreciated .               Hypothyroidism- (present on admission)   Assessment & Plan    Increased Tsh.  T4 Nl  Obtain outpt med list .        Hyperlipidemia- (present on admission)   Assessment & Plan    Med rec not done, will obtain.         Hearing loss of left ear- (present on admission)   Assessment & Plan    Plan fu MRI , workup as above.           Quality-Core Measures   Reviewed items::  Medications reviewed, Labs reviewed and Radiology images reviewed  Babcock catheter::  No Babcock  DVT  prophylaxis - mechanical:  SCDs  Antibiotics:  Treating active infection/contamination beyond 24 hours perioperative coverage

## 2018-07-04 NOTE — PROGRESS NOTES
Patient given results of recent PSA   Patient on the unit. Vilma, 1:1 PSA at bedside in observance of Legal Hold.

## 2018-07-04 NOTE — ASSESSMENT & PLAN NOTE
Slightly improved   Concern for Encephalitis--elevated ESR , anti TPO Atbs suggest Hashimotos Encephalitis or autoimmune cause.   Spoke with endocrine and rheumatology with no further recommendations and to refer to neurology  F/U on paraneoplastic and autoimmune panels   Sulma consulted ?psych component vs. Medical    Continue Risperdal and Depakote for now  Legal hold d/c   Continue steroids   Consider transfer to [place] if no improvement in current treatment plan

## 2018-07-04 NOTE — CARE PLAN
Problem: Safety  Goal: Will remain free from injury  Outcome: PROGRESSING AS EXPECTED  Bed locked and in lowest position. Call light and personal belongings in reach. Bed alarm in place. 1:1 sitter at bedside. Hourly rounding.     Problem: Venous Thromboembolism (VTW)/Deep Vein Thrombosis (DVT) Prevention:  Goal: Patient will participate in Venous Thrombosis (VTE)/Deep Vein Thrombosis (DVT)Prevention Measures  Outcome: PROGRESSING AS EXPECTED  SCDs in place

## 2018-07-04 NOTE — PROGRESS NOTES
[facility] Hospitalist Progress Note    Date of Service: 2018    Chief Complaint  53 y.o. female transferred from [facility]  7/3/2018 with Aloc psychosis, aggressive behavior to family, left hearing loss.     Interval Problem Update    Inconsistently following directions. Daughter at bedside reports mother has been hearing voices and paranoid about the grandchild and son. Not sleeping     Consultants/Specialty    Psychiatry     Disposition    TBD        Review of Systems   Unable to perform ROS: Medical condition   Constitutional:        Awake but doesn't want to answer questions.       Physical Exam  Laboratory/Imaging   Hemodynamics  Temp (24hrs), Av.7 °C (98 °F), Min:36.2 °C (97.2 °F), Max:37.1 °C (98.8 °F)   Temperature: 37.1 °C (98.8 °F)  Pulse  Av.4  Min: 66  Max: 92    Blood Pressure: 147/70      Respiratory      Respiration: 18, Pulse Oximetry: 94 %             Fluids    Intake/Output Summary (Last 24 hours) at 18 1302  Last data filed at 18 0805   Gross per 24 hour   Intake             1260 ml   Output                0 ml   Net             1260 ml       Nutrition  Orders Placed This Encounter   Procedures   • Diet Order Regular (Plastic utensils only)     Standing Status:   Standing     Number of Occurrences:   1     Order Specific Question:   Diet:     Answer:   Regular [1]     Comments:   Plastic utensils only     Physical Exam   Constitutional: No distress.   Flat affect, not answering questions.    HENT:   Head: Normocephalic and atraumatic.   Right Ear: External ear normal.   Left Ear: External ear normal.   Nose: Nose normal.   Eyes: EOM are normal. Right eye exhibits no discharge. Left eye exhibits no discharge. No scleral icterus.   Neck: Neck supple. No JVD present.   Cardiovascular: Normal rate and regular rhythm.    No murmur heard.  Pulmonary/Chest: Effort normal. No stridor. She has no wheezes. She has no rales.   Abdominal: Soft. Bowel sounds are normal. She  exhibits no distension. There is no tenderness.   Obese.    Musculoskeletal: She exhibits no edema or tenderness.   Neurological: She is alert.   Limited- no gross focal weakness   Skin: Skin is warm and dry. She is not diaphoretic. No pallor.   Vitals reviewed.      Recent Labs      07/01/18 2013 07/03/18   0312   WBC  10.7  7.8   RBC  4.75  4.76   HEMOGLOBIN  13.8  13.4   HEMATOCRIT  41.0  41.5   MCV  86.3  87.2   MCH  29.1  28.2   MCHC  33.7  32.3*   RDW  41.0  42.3   PLATELETCT  311  290   MPV  10.2  10.4     Recent Labs      07/01/18 2013 07/03/18   0312   SODIUM  138  140   POTASSIUM  3.9  4.0   CHLORIDE  108  107   CO2  21  27   GLUCOSE  140*  95   BUN  20  17   CREATININE  1.12  1.00   CALCIUM  9.1  9.2             Recent Labs      07/01/18 2013   TRIGLYCERIDE  118   HDL  44   LDL  139*          Assessment/Plan     Acute psychosis- (present on admission)   Assessment & Plan    Concern for encephalitis, underlying stroke or mass, underlying psychiatric Do  Severe agitation - unable to do MRI , LP - plan IR to assist w anesthesia.   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   Fu MRI brain to eval for cerebritis, Mass, Cva.   Plan LP w Hsv pcr, wnv, cell ct, ,gram stain, protein, viral encephalitis workup and NMNDA antibodies   Discussed with Dr. Fitch neurology to consult.    Has episodes of paranoia against grandchildren and lacks capacity to make medical decisions - will continue legal hold.   LIfe skills input appreciated .               Hypothyroidism- (present on admission)   Assessment & Plan    Increased Tsh.  T4 Nl  Will continue current home meds, try to get info from daughter.         Hyperlipidemia- (present on admission)   Assessment & Plan    Obtain outpatient medications         Hearing loss of left ear- (present on admission)   Assessment & Plan    Plan fu MRI , workup as above.           Quality-Core Measures   Reviewed items::  Medications reviewed, Labs reviewed and Radiology  images reviewed  Babcock catheter::  No Babcock  DVT prophylaxis - mechanical:  SCDs  Antibiotics:  Treating active infection/contamination beyond 24 hours perioperative coverage

## 2018-07-05 ENCOUNTER — APPOINTMENT (OUTPATIENT)
Dept: RADIOLOGY | Facility: MEDICAL CENTER | Age: 53
DRG: 071 | End: 2018-07-05
Attending: HOSPITALIST
Payer: MEDICAID

## 2018-07-05 LAB
INR PPP: 1.06 (ref 0.87–1.13)
PROTHROMBIN TIME: 13.5 SEC (ref 12–14.6)
WNV IGG SER QL IA: NORMAL
WNV IGM SER QL IA: NORMAL

## 2018-07-05 PROCEDURE — 770006 HCHG ROOM/CARE - MED/SURG/GYN SEMI*

## 2018-07-05 PROCEDURE — 700111 HCHG RX REV CODE 636 W/ 250 OVERRIDE (IP): Performed by: INTERNAL MEDICINE

## 2018-07-05 PROCEDURE — 83916 OLIGOCLONAL BANDS: CPT

## 2018-07-05 PROCEDURE — 88108 CYTOPATH CONCENTRATE TECH: CPT

## 2018-07-05 PROCEDURE — 99232 SBSQ HOSP IP/OBS MODERATE 35: CPT | Performed by: HOSPITALIST

## 2018-07-05 PROCEDURE — 700105 HCHG RX REV CODE 258: Performed by: HOSPITALIST

## 2018-07-05 PROCEDURE — 700111 HCHG RX REV CODE 636 W/ 250 OVERRIDE (IP): Performed by: HOSPITALIST

## 2018-07-05 PROCEDURE — 85610 PROTHROMBIN TIME: CPT

## 2018-07-05 PROCEDURE — 36415 COLL VENOUS BLD VENIPUNCTURE: CPT

## 2018-07-05 RX ORDER — LORAZEPAM 2 MG/ML
1 INJECTION INTRAMUSCULAR EVERY 4 HOURS PRN
Status: DISCONTINUED | OUTPATIENT
Start: 2018-07-05 | End: 2018-07-11 | Stop reason: HOSPADM

## 2018-07-05 RX ADMIN — ACYCLOVIR SODIUM 455 MG: 500 INJECTION, SOLUTION INTRAVENOUS at 01:41

## 2018-07-05 RX ADMIN — LORAZEPAM 1 MG: 2 INJECTION INTRAMUSCULAR; INTRAVENOUS at 21:27

## 2018-07-05 RX ADMIN — ACYCLOVIR SODIUM 455 MG: 500 INJECTION, SOLUTION INTRAVENOUS at 09:34

## 2018-07-05 RX ADMIN — ACYCLOVIR SODIUM 455 MG: 500 INJECTION, SOLUTION INTRAVENOUS at 20:21
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
## 2018-07-05 NOTE — PROGRESS NOTES
[facility] Hospitalist Progress Note    Date of Service: 2018    Chief Complaint  53 y.o. female transferred from [facility]  7/3/2018 with Aloc psychosis, aggressive behavior to family, left hearing loss.     Interval Problem Update    Affect remains flat. Non verbal. No fevers. Plan MRI brain.     Consultants/Specialty    Psychiatry     Disposition    TBD        Review of Systems   Unable to perform ROS: Medical condition   Constitutional:        Awake but doesn't want to answer questions.       Physical Exam  Laboratory/Imaging   Hemodynamics  Temp (24hrs), Av °C (98.6 °F), Min:36.8 °C (98.3 °F), Max:37.3 °C (99.1 °F)   Temperature: 36.8 °C (98.3 °F)  Pulse  Av.6  Min: 66  Max: 92    Blood Pressure: 144/59      Respiratory      Respiration: 14, Pulse Oximetry: 95 %             Fluids    Intake/Output Summary (Last 24 hours) at 18 1610  Last data filed at 18 0700   Gross per 24 hour   Intake              830 ml   Output                0 ml   Net              830 ml       Nutrition  Orders Placed This Encounter   Procedures   • Diet Order Regular (Plastic utensils only)     Standing Status:   Standing     Number of Occurrences:   1     Order Specific Question:   Diet:     Answer:   Regular [1]     Comments:   Plastic utensils only     Physical Exam   Constitutional: No distress.   Awake, nonverbal, occasional mumbles   HENT:   Head: Normocephalic and atraumatic.   Right Ear: External ear normal.   Left Ear: External ear normal.   Nose: Nose normal.   Eyes: EOM are normal. Right eye exhibits no discharge. Left eye exhibits no discharge. No scleral icterus.   Neck: Neck supple. No JVD present.   Cardiovascular: Normal rate and regular rhythm.    No murmur heard.  Pulmonary/Chest: Effort normal. No stridor. She has no wheezes. She has no rales.   Abdominal: Soft. Bowel sounds are normal. She exhibits no distension. There is no tenderness.   Obese.    Musculoskeletal: She exhibits no  edema or tenderness.   Neurological: She is alert.   Limited- no gross focal weakness   Skin: Skin is warm and dry. She is not diaphoretic. No pallor.   Psychiatric: Her affect is blunt. Thought content is paranoid. She is inattentive.   Vitals reviewed.      Recent Labs      07/03/18   0312   WBC  7.8   RBC  4.76   HEMOGLOBIN  13.4   HEMATOCRIT  41.5   MCV  87.2   MCH  28.2   MCHC  32.3*   RDW  42.3   PLATELETCT  290   MPV  10.4     Recent Labs      07/03/18   0312   SODIUM  140   POTASSIUM  4.0   CHLORIDE  107   CO2  27   GLUCOSE  95   BUN  17   CREATININE  1.00   CALCIUM  9.2     Recent Labs      07/05/18   0939   INR  1.06                  Assessment/Plan     Acute psychosis- (present on admission)   Assessment & Plan    Concern for encephalitis, underlying stroke or mass, underlying psychiatric Do  Severe agitation - unable to do MRI , LP - plan IR to assist w anesthesia.   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   Fu MRI brain to eval for cerebritis, Mass, Cva.   Plan LP w Hsv pcr, wnv, cell ct, ,gram stain, protein, viral encephalitis workup and NMNDA antibodies   Discussed with Dr. Fitch neurology to consult.    Has episodes of paranoia against grandchildren and lacks capacity to make medical decisions - will continue legal hold.   LIfe skills input appreciated .               Hypothyroidism- (present on admission)   Assessment & Plan    Increased Tsh.  T4 Nl  Obtain medication list from daughter, try to resume.         Hyperlipidemia- (present on admission)   Assessment & Plan    Obtain outpatient medications         Hearing loss of left ear- (present on admission)   Assessment & Plan    Plan fu MRI , workup as above.           Quality-Core Measures   Reviewed items::  Medications reviewed, Labs reviewed and Radiology images reviewed  Babcock catheter::  No Babcock  DVT prophylaxis - mechanical:  SCDs  Antibiotics:  Treating active infection/contamination beyond 24 hours perioperative coverage

## 2018-07-05 NOTE — DISCHARGE PLANNING
Anticipated Discharge Disposition: Psych Facility    Action: LSW faxed legal hold paperwork to D[facility]ne ([id number])    Barriers to Discharge: Medical Clearance; Accepting Facility    Plan: LSW to f/u for medical clearance
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
## 2018-07-05 NOTE — CARE PLAN
Problem: Nutritional:  Goal: Achieve adequate nutritional intake  Patient will consume >50% of meals  Outcome: PROGRESSING SLOWER THAN EXPECTED  See note for details.

## 2018-07-05 NOTE — CARE PLAN
Problem: Communication  Goal: The ability to communicate needs accurately and effectively will improve  Outcome: PROGRESSING AS EXPECTED  Pt does not speak often, pt has difficulty expressing needs, flat affect, rounding on pt regularly.    Problem: Psychosocial Needs:  Goal: Level of anxiety will decrease  Outcome: PROGRESSING AS EXPECTED  Pt has flat affect and withdrawn, pt does not vocalize feelings

## 2018-07-05 NOTE — DISCHARGE PLANNING
Dr. Linda Hauser has seen pt today, hold will not be extending. If hold is needed later, a new hold will be initiated. Hold will  today at 1700.

## 2018-07-05 NOTE — PROGRESS NOTES
2004: pt in bed, flat affect, pt appears withdrawn refusing to speak much to writer answering most questions by nodding her head. Pt denies pain at this time. Pt is a legal hold and has a sitter at bedside.    0310: pt looking at ceiling and kicking at things, although when asked if she hears or is seeing anything she does not respond. Pt still responding very minimally verbally. Pt encouraged to drink water, pt often declining.

## 2018-07-05 NOTE — PROGRESS NOTES
Assumed care of patient at 0700.  Received report from night RN.  Pt alert and oriented x 1, has no complaints of pain. Pt resting comfortably in bed. Pt is on a legal hold, a sitter is at her side at all times.

## 2018-07-05 NOTE — DIETARY
"Nutrition services:     Day 2 of admit.  Vilma Foster is a 53 y.o. female with admitting DX of acute psychosis, loss of memory, altered mental status.     Pt noted with inconsistent PO x2 days since admit. Nutrition rep communicated concern since pt wasn't willing to provide food preferences for daily menus. Attempted to interview pt during bed side visit today however she is lethargic, unresponsive and did not answer to questions. Will add snacks since nutrition rep suspects pt likes chocolate related foods - will wait on family member visit to obtain more information/food preferences.     Assessment:  Height: 149.9 cm (4' 11.02\")  Weight: 86.4 kg (190 lb 7.6 oz) via other healthcare provider on 7/3   Body mass index is 38.45 kg/m². (Obese class II)   Diet/Intake: Regular, PO <25%     Evaluation:   1. No pertinent labs or medications per MAR at this time.   2. No medical/surgery hx on file.     Recommendations/Plan:  1. Encourage intake of meals and snacks.   2. Document intake of all meals and snacks and document accordingly as ADL's   3. Monitor weight.  4. Nutrition rep will continue to see patient for ongoing meal and snack preferences.     RD monitoring             "

## 2018-07-06 ENCOUNTER — APPOINTMENT (OUTPATIENT)
Dept: RADIOLOGY | Facility: MEDICAL CENTER | Age: 53
DRG: 071 | End: 2018-07-06
Attending: HOSPITALIST
Payer: MEDICAID

## 2018-07-06 LAB
BURR CELLS/RBC NFR CSF MANUAL: 0 %
CLARITY CSF: CLEAR
COLOR CSF: COLORLESS
COLOR SPUN CSF: COLORLESS
CRP SERPL HS-MCNC: 0.84 MG/DL (ref 0–0.75)
GLUCOSE CSF-MCNC: 58 MG/DL (ref 40–80)
GRAM STN SPEC: NORMAL
LACTATE BLD-SCNC: 1.7 MMOL/L (ref 0.5–2)
LYMPHOCYTES NFR CSF: 89 %
MAGNESIUM SERPL-MCNC: 1.9 MG/DL (ref 1.5–2.5)
MONONUC CELLS NFR CSF: 11 %
PROT CSF-MCNC: 37 MG/DL (ref 15–45)
RBC # CSF: <1 CELLS/UL
RHEUMATOID FACT SER IA-ACNC: <10 IU/ML (ref 0–14)
SIGNIFICANT IND 70042: NORMAL
SITE SITE: NORMAL
SOURCE SOURCE: NORMAL
SPECIMEN VOL CSF: 13 ML
THYROPEROXIDASE AB SERPL-ACNC: 311.6 IU/ML (ref 0–9)
TUBE # CSF: 3
TUBE # CSF: 3
VIT B12 SERPL-MCNC: 339 PG/ML (ref 211–911)
WBC # CSF: 3 CELLS/UL (ref 0–10)

## 2018-07-06 PROCEDURE — 700111 HCHG RX REV CODE 636 W/ 250 OVERRIDE (IP): Performed by: HOSPITALIST

## 2018-07-06 PROCEDURE — A9270 NON-COVERED ITEM OR SERVICE: HCPCS | Performed by: HOSPITALIST

## 2018-07-06 PROCEDURE — 89051 BODY FLUID CELL COUNT: CPT

## 2018-07-06 PROCEDURE — 86431 RHEUMATOID FACTOR QUANT: CPT

## 2018-07-06 PROCEDURE — 700117 HCHG RX CONTRAST REV CODE 255: Performed by: HOSPITALIST

## 2018-07-06 PROCEDURE — 87798 DETECT AGENT NOS DNA AMP: CPT

## 2018-07-06 PROCEDURE — A9577 INJ MULTIHANCE: HCPCS | Performed by: HOSPITALIST

## 2018-07-06 PROCEDURE — B01BZZZ FLUOROSCOPY OF SPINAL CORD: ICD-10-PCS | Performed by: RADIOLOGY

## 2018-07-06 PROCEDURE — 83605 ASSAY OF LACTIC ACID: CPT

## 2018-07-06 PROCEDURE — 86800 THYROGLOBULIN ANTIBODY: CPT

## 2018-07-06 PROCEDURE — 86341 ISLET CELL ANTIBODY: CPT

## 2018-07-06 PROCEDURE — 86376 MICROSOMAL ANTIBODY EACH: CPT

## 2018-07-06 PROCEDURE — 87070 CULTURE OTHR SPECIMN AEROBIC: CPT

## 2018-07-06 PROCEDURE — 95951 EEG: CPT | Mod: 52

## 2018-07-06 PROCEDURE — 009U3ZX DRAINAGE OF SPINAL CANAL, PERCUTANEOUS APPROACH, DIAGNOSTIC: ICD-10-PCS | Performed by: RADIOLOGY

## 2018-07-06 PROCEDURE — 70553 MRI BRAIN STEM W/O & W/DYE: CPT

## 2018-07-06 PROCEDURE — 160002 HCHG RECOVERY MINUTES (STAT)

## 2018-07-06 PROCEDURE — 4A00X4Z MEASUREMENT OF CENTRAL NERVOUS ELECTRICAL ACTIVITY, EXTERNAL APPROACH: ICD-10-PCS | Performed by: PSYCHIATRY & NEUROLOGY

## 2018-07-06 PROCEDURE — 86200 CCP ANTIBODY: CPT

## 2018-07-06 PROCEDURE — 87529 HSV DNA AMP PROBE: CPT

## 2018-07-06 PROCEDURE — 62270 DX LMBR SPI PNXR: CPT

## 2018-07-06 PROCEDURE — 700101 HCHG RX REV CODE 250

## 2018-07-06 PROCEDURE — 86140 C-REACTIVE PROTEIN: CPT

## 2018-07-06 PROCEDURE — 87205 SMEAR GRAM STAIN: CPT

## 2018-07-06 PROCEDURE — 84425 ASSAY OF VITAMIN B-1: CPT

## 2018-07-06 PROCEDURE — 86147 CARDIOLIPIN ANTIBODY EA IG: CPT | Mod: 91

## 2018-07-06 PROCEDURE — 86225 DNA ANTIBODY NATIVE: CPT

## 2018-07-06 PROCEDURE — 86038 ANTINUCLEAR ANTIBODIES: CPT

## 2018-07-06 PROCEDURE — 82607 VITAMIN B-12: CPT

## 2018-07-06 PROCEDURE — 700105 HCHG RX REV CODE 258: Performed by: HOSPITALIST

## 2018-07-06 PROCEDURE — 86235 NUCLEAR ANTIGEN ANTIBODY: CPT

## 2018-07-06 PROCEDURE — 99232 SBSQ HOSP IP/OBS MODERATE 35: CPT | Performed by: HOSPITALIST

## 2018-07-06 PROCEDURE — 700102 HCHG RX REV CODE 250 W/ 637 OVERRIDE(OP): Performed by: HOSPITALIST

## 2018-07-06 PROCEDURE — 86592 SYPHILIS TEST NON-TREP QUAL: CPT

## 2018-07-06 PROCEDURE — 83516 IMMUNOASSAY NONANTIBODY: CPT

## 2018-07-06 PROCEDURE — 83735 ASSAY OF MAGNESIUM: CPT

## 2018-07-06 PROCEDURE — 84207 ASSAY OF VITAMIN B-6: CPT

## 2018-07-06 PROCEDURE — 770006 HCHG ROOM/CARE - MED/SURG/GYN SEMI*

## 2018-07-06 PROCEDURE — 87498 ENTEROVIRUS PROBE&REVRS TRNS: CPT

## 2018-07-06 PROCEDURE — 82784 ASSAY IGA/IGD/IGG/IGM EACH: CPT

## 2018-07-06 PROCEDURE — 86255 FLUORESCENT ANTIBODY SCREEN: CPT | Mod: 91

## 2018-07-06 PROCEDURE — 36415 COLL VENOUS BLD VENIPUNCTURE: CPT

## 2018-07-06 PROCEDURE — 700111 HCHG RX REV CODE 636 W/ 250 OVERRIDE (IP)

## 2018-07-06 RX ORDER — RISPERIDONE 0.5 MG/1
1 TABLET ORAL EVERY EVENING
Status: DISCONTINUED | OUTPATIENT
Start: 2018-07-06 | End: 2018-07-11 | Stop reason: HOSPADM

## 2018-07-06 RX ORDER — LIDOCAINE HYDROCHLORIDE 40 MG/ML
SOLUTION TOPICAL
Status: DISPENSED
Start: 2018-07-06 | End: 2018-07-06

## 2018-07-06 RX ADMIN — GADOBENATE DIMEGLUMINE 10 ML: 529 INJECTION, SOLUTION INTRAVENOUS at 11:07

## 2018-07-06 RX ADMIN — ACYCLOVIR SODIUM 455 MG: 500 INJECTION, SOLUTION INTRAVENOUS at 22:17

## 2018-07-06 RX ADMIN — ACYCLOVIR SODIUM 455 MG: 500 INJECTION, SOLUTION INTRAVENOUS at 15:07

## 2018-07-06 RX ADMIN — ACYCLOVIR SODIUM 455 MG: 500 INJECTION, SOLUTION INTRAVENOUS at 03:58

## 2018-07-06 NOTE — DISCHARGE PLANNING
Anticipated Discharge Disposition: TBD    Action: LSW met with pt's dtr, Nia louis.  Needs FMLA paperwork filled out. This worker explained that due to pt being deemed incompetent pt can not sign a Release of Information (KAREN).  This worker has sent and email to Krista inquiring how FMLA can be completed. Once there is an answer this worker will update her.    Barriers to Discharge: unkown    Plan: LSW to f/u with FMLA paperwork

## 2018-07-06 NOTE — OR SURGEON
Immediate Post- Operative Note        PostOp Diagnosis: clear CSF      Procedure(s): fluoro guided lumbar puncture      Estimated Blood Loss: Less than 5 ml        Complications: None            7/6/2018     11:38 AM     Dudley Padilla

## 2018-07-06 NOTE — CARE PLAN
Problem: Safety  Goal: Will remain free from falls  Outcome: PROGRESSING AS EXPECTED  Patient transferred to room closer to nurses station. Bilateral soft wrist restraints in place for safety. Bed alarm on, treaded slipper socks on and hourly rounding in place.     Problem: Skin Integrity  Goal: Risk for impaired skin integrity will decrease  Outcome: PROGRESSING AS EXPECTED  Patient encouraged to turn. Q2 hour turns in place.

## 2018-07-06 NOTE — PROGRESS NOTES
[facility] Hospitalist Progress Note    Date of Service: 2018    Chief Complaint  53 y.o. female transferred from [facility]  7/3/2018 with Aloc psychosis, aggressive behavior to family, left hearing loss.     Interval Problem Update    Severe agitation and was combative overnight.  MRI brain unremarkable. In restraints.       Consultants/Specialty    Psychiatry     Disposition    TBD        Review of Systems   Unable to perform ROS: Medical condition   Constitutional:        Awake but doesn't want to answer questions.       Physical Exam  Laboratory/Imaging   Hemodynamics  Temp (24hrs), Av.6 °C (97.9 °F), Min:36.1 °C (97 °F), Max:37.2 °C (98.9 °F)   Temperature: 37.2 °C (98.9 °F)  Pulse  Av  Min: 66  Max: 105 Heart Rate (Monitored): 95  Blood Pressure: 153/65, NIBP: 159/68      Respiratory      Respiration: 18, Pulse Oximetry: 97 %             Fluids  No intake or output data in the 24 hours ending 18 1500    Nutrition  Orders Placed This Encounter   Procedures   • Diet Order Regular (Plastic utensils only)     Standing Status:   Standing     Number of Occurrences:   1     Order Specific Question:   Diet:     Answer:   Regular [1]     Comments:   Plastic utensils only     Physical Exam   Constitutional: No distress.   Awake , Flat affect. Not following commands.    HENT:   Head: Normocephalic and atraumatic.   Right Ear: External ear normal.   Left Ear: External ear normal.   Nose: Nose normal.   Eyes: EOM are normal. Right eye exhibits no discharge. Left eye exhibits no discharge. No scleral icterus.   Neck: Neck supple. No JVD present.   Cardiovascular: Normal rate and regular rhythm.    No murmur heard.  Pulmonary/Chest: No stridor. She has no wheezes. She has no rales.   Abdominal: Soft. Bowel sounds are normal. She exhibits no distension. There is no tenderness.   Obese.    Musculoskeletal: She exhibits no edema or tenderness.   Neurological: She is alert.   Limited- no gross focal  weakness   Skin: Skin is warm and dry. She is not diaphoretic. No pallor.   Psychiatric: Her affect is blunt. She is inattentive.   Vitals reviewed.              Recent Labs      07/05/18   0939   INR  1.06                  Assessment/Plan     Acute psychosis- (present on admission)   Assessment & Plan    Concern for Encephalitis--elevated anti TPO Atbs may suggest Hashimotos Encephalitis.   Will Discuss w neurology - consider steroids.   Fu Csf studies   Probably not HSV encephalitis, afebrile - Continue Emp IV acyclovir for now . Dc if PCR negative. .   Lacks medical decision making- - will continue legal hold.   LIfe skills input appreciated .               Hypothyroidism- (present on admission)   Assessment & Plan    Increased Tsh.  T4 Nl--suggest subclinical hypothyroidism.           Hyperlipidemia- (present on admission)   Assessment & Plan    Not on medications , fu outpatient.         Hearing loss of left ear- (present on admission)   Assessment & Plan    MRI no acute findings.           Quality-Core Measures   Reviewed items::  Medications reviewed, Labs reviewed and Radiology images reviewed  Babcock catheter::  No Babcock  DVT prophylaxis - mechanical:  SCDs  Antibiotics:  Treating active infection/contamination beyond 24 hours perioperative coverage      Discussed with daughter and multi disciplinary team plan of care.

## 2018-07-06 NOTE — PROGRESS NOTES
Pt went for MRI and LP with sedation via bed.  Pt alert but not responding to questions.  Nia, pt's dtr, at bedside.

## 2018-07-06 NOTE — PROGRESS NOTES
Pt A&O to self only, pt mostly non verbal, refuses to answer questions. Pt on legal hold. Pt in bilateral wrist restraints in place, Q2 turns in place. Daughter at bedside. Bed alarm on, call light within reach, hourly rounding in place.

## 2018-07-06 NOTE — PROGRESS NOTES
PT on a legal hold originally for harm to others. Per Dr. Lakhani, wants pt to be on a legal hold as pt lacks capacity to make own decisions but does not need a sitter at this time.

## 2018-07-06 NOTE — PROGRESS NOTES
Late entry:    2015: Primary RN (myself) and nurse apprentice in with patient. Patient assisted up to bedside commode with 1 person assist.   After assistance up to bed side commode patient assisted back to bed however, patient refusing to get back in bed. Recliner chair offered however, patient did not respond. Just stood there. While standing there patient began to start falling. Patient assisted to edge of bed.  Patient continued to make numerous unsafe attempts out of bed. Patient appeared very anxious. Lap belt placed for safety. Patient able to remove lap belt and continued to get up out of bed.    2100: After attempting to calm patient down. Patient still anxious and screaming. Attempting to hit/kick staff. Dr. Coffey updated. Orders received for 1mg IV Ativan and bilateral soft wrist restraints.   2115: Patient removed IV. Dr. Coffey updated and ok'd to give Ativan IM. Bilateral soft wrist restraints placed and IM Ativan administered. Daughter Nia called and notified.   2130: RN responded to patient's bed alarm. Patient had removed bilateral wrist restraints and was standing next to bed. Charge RN notified and asked about possible sitter.  2145: No sitters available so patient was moved to room closer to nurses station. Vest restraint placed and daughter notified.   2210: Daughter called about possibly staying with patient to help calm her down. RN agreeable. Patient still slightly agitated however, improving.   2300: Daughter in with patient. Patient now calm and cooperative. New IV placed. Vest restraint removed.

## 2018-07-06 NOTE — PROGRESS NOTES
IR Procedure Note:    Pt's daughter consented for patient's lumbar puncture, MRI and general anesthesia.    Lumbar puncture and MRI of the brain with and without contrast with general anesthesia by Dr. Bhatti. All vital signs monitoring and medication administration by anesthesia services. - See anesthesia record.  Pt transported to [facility] with Dr. Bhatti. Pulse oximetry  = 99% on oxygen via simple mask @ 10 lpm. Report given to APOLINAR Esparza.      Luis Fernandoderlisa and paperwork for West Nile Virus returned to heriberto Meza RN.

## 2018-07-07 LAB
ANION GAP SERPL CALC-SCNC: 7 MMOL/L (ref 0–11.9)
APTT HEX PL PPP: POSITIVE S
BUN SERPL-MCNC: 21 MG/DL (ref 8–22)
CALCIUM SERPL-MCNC: 9 MG/DL (ref 8.5–10.5)
CHLORIDE SERPL-SCNC: 107 MMOL/L (ref 96–112)
CO2 SERPL-SCNC: 24 MMOL/L (ref 20–33)
CREAT SERPL-MCNC: 1.04 MG/DL (ref 0.5–1.4)
GAD65 AB SER IA-ACNC: <5 IU/ML (ref 0–5)
GLUCOSE SERPL-MCNC: 109 MG/DL (ref 65–99)
HSV1+2 DNA SPEC QL NAA+PROBE: NORMAL
HSV1+2 IGM CSF-ACNC: 0.17 IV
IGA SERPL-MCNC: 155 MG/DL (ref 68–408)
POTASSIUM SERPL-SCNC: 4.1 MMOL/L (ref 3.6–5.5)
SIGNIFICANT IND 70042: NORMAL
SITE SITE: NORMAL
SODIUM SERPL-SCNC: 138 MMOL/L (ref 135–145)
SOURCE SOURCE: NORMAL
THYROGLOB AB SERPL-ACNC: 1.8 IU/ML (ref 0–4)

## 2018-07-07 PROCEDURE — 99233 SBSQ HOSP IP/OBS HIGH 50: CPT | Performed by: HOSPITALIST

## 2018-07-07 PROCEDURE — 85613 RUSSELL VIPER VENOM DILUTED: CPT

## 2018-07-07 PROCEDURE — 700111 HCHG RX REV CODE 636 W/ 250 OVERRIDE (IP): Performed by: HOSPITALIST

## 2018-07-07 PROCEDURE — 700102 HCHG RX REV CODE 250 W/ 637 OVERRIDE(OP): Performed by: HOSPITALIST

## 2018-07-07 PROCEDURE — 700105 HCHG RX REV CODE 258: Performed by: SPECIALIST

## 2018-07-07 PROCEDURE — 700111 HCHG RX REV CODE 636 W/ 250 OVERRIDE (IP): Performed by: SPECIALIST

## 2018-07-07 PROCEDURE — A9270 NON-COVERED ITEM OR SERVICE: HCPCS | Performed by: HOSPITALIST

## 2018-07-07 PROCEDURE — 80048 BASIC METABOLIC PNL TOTAL CA: CPT

## 2018-07-07 PROCEDURE — 85730 THROMBOPLASTIN TIME PARTIAL: CPT

## 2018-07-07 PROCEDURE — 770006 HCHG ROOM/CARE - MED/SURG/GYN SEMI*

## 2018-07-07 PROCEDURE — 85610 PROTHROMBIN TIME: CPT

## 2018-07-07 PROCEDURE — 36415 COLL VENOUS BLD VENIPUNCTURE: CPT

## 2018-07-07 PROCEDURE — 700105 HCHG RX REV CODE 258: Performed by: HOSPITALIST

## 2018-07-07 RX ADMIN — VALPROATE SODIUM 500 MG: 100 INJECTION, SOLUTION INTRAVENOUS at 18:40

## 2018-07-07 RX ADMIN — STANDARDIZED SENNA CONCENTRATE AND DOCUSATE SODIUM 2 TABLET: 8.6; 5 TABLET, FILM COATED ORAL at 08:18

## 2018-07-07 RX ADMIN — ACYCLOVIR SODIUM 455 MG: 500 INJECTION, SOLUTION INTRAVENOUS at 12:07

## 2018-07-07 RX ADMIN — RISPERIDONE 1 MG: 0.5 TABLET ORAL at 20:49

## 2018-07-07 RX ADMIN — ACYCLOVIR SODIUM 455 MG: 500 INJECTION, SOLUTION INTRAVENOUS at 20:49

## 2018-07-07 RX ADMIN — STANDARDIZED SENNA CONCENTRATE AND DOCUSATE SODIUM 2 TABLET: 8.6; 5 TABLET, FILM COATED ORAL at 20:49

## 2018-07-07 RX ADMIN — SODIUM CHLORIDE 1000 MG: 9 INJECTION, SOLUTION INTRAVENOUS at 17:04

## 2018-07-07 RX ADMIN — ACYCLOVIR SODIUM 455 MG: 500 INJECTION, SOLUTION INTRAVENOUS at 04:18

## 2018-07-07 ASSESSMENT — ENCOUNTER SYMPTOMS
HALLUCINATIONS: 1
EYE DISCHARGE: 0
SPEECH CHANGE: 1
BLOOD IN STOOL: 0
FALLS: 1

## 2018-07-07 ASSESSMENT — PAIN SCALES - GENERAL: PAINLEVEL_OUTOF10: 0

## 2018-07-07 NOTE — CARE PLAN
Problem: Communication  Goal: The ability to communicate needs accurately and effectively will improve    Intervention: [place] patient and significant other/support system to call light to alert staff of needs  Educated pt on using call light for assistance and needs. No learning evident. Pt non verbal.       Problem: Knowledge Deficit  Goal: Knowledge of disease process/condition, treatment plan, diagnostic tests, and medications will improve    Intervention: Assess knowledge level of disease process/condition, treatment plan, diagnostic tests, and medications  Pt unable to verbalize understanding of plan of care.

## 2018-07-07 NOTE — PROGRESS NOTES
Neurology Progress Note               Author: ALTHEA Sequeira Date & Time created: 2018  3:31 PM     Interval History:  History of recent agitation, psychosis.  MRI and LP and EEG are negative.  Anti tpo antibodies are significantly elevated at 311.6, uln is 9.  ESR mildly elevated.    Review of Systems:  ROS    Physical Exam:  Physical Exam   Neurological:   Alert, affect is frightened, agitated.  Little verbal response.  Motor appears =.  DTR are =.       Labs:        Invalid input(s): [id number]      Recent Labs      18   1241  18   0231   SODIUM   --   138   POTASSIUM   --   4.1   CHLORIDE   --   107   CO2   --   24   BUN   --   21   CREATININE   --   1.04   MAGNESIUM  1.9   --    CALCIUM   --   9.0     Recent Labs      18   0231   GLUCOSE  109*     Recent Labs      18   0939   PROTHROMBTM  13.5   INR  1.06         Recent Labs      18   0231   SODIUM  138   POTASSIUM  4.1   CHLORIDE  107   CO2  24   GLUCOSE  109*   BUN  21   CREATININE  1.04   CALCIUM  9.0     Hemodynamics:  Temp (24hrs), Av.4 °C (97.6 °F), Min:36.2 °C (97.1 °F), Max:36.8 °C (98.3 °F)  Temperature: 36.2 °C (97.2 °F)  Pulse  Av.7  Min: 66  Max: 105   Blood Pressure: 131/67     Respiratory:    Respiration: 19, Pulse Oximetry: 95 %           Fluids:    Intake/Output Summary (Last 24 hours) at 18 1531  Last data filed at 18 0800   Gross per 24 hour   Intake              100 ml   Output                0 ml   Net              100 ml        GI/Nutrition:  Orders Placed This Encounter   Procedures   • Diet Order Regular (Plastic utensils only)     Standing Status:   Standing     Number of Occurrences:   1     Order Specific Question:   Diet:     Answer:   Regular [1]     Comments:   Plastic utensils only     Medical Decision Making, by Problem:  Active Hospital Problems    Diagnosis   • Acute psychosis [F23]   • Hearing loss of left ear [H91.92]   • Hypothyroidism [E03.9]   • Hyperlipidemia [E78.5]        Plan:  History and anti tpo antibodies are consistent with an autoimmune encephalitis.  Will rx with iv solumedrol 1 gm x 5 days.  Also start vpa 250 mg bid.  Discussed with hospitalist.    Quality-Core Measures

## 2018-07-07 NOTE — PROCEDURES
DATE OF SERVICE:  07/06/2018    This is inpatient EEG, was done on 07/06/2018.    ROUTINE VIDEO ELECTROENCEPHALOGRAM REPORT        NAME: Vilma Foster     REFERRING Dr: LORRIE     DURATION:  28 minutes     INDICATION: Acute Psychosis        TECHNIQUE: 30 channel routine electroencephalogram (EEG) was performed in accordance with the international 10-20 system. The study was reviewed in bipolar and referential montages. The recording examined the patient during wakeful and drowsy state(s).      DESCRIPTION OF THE RECORD:        Background rhythm during awake stage shows well-organized, well-developed, average voltage 10 to 11 hertz alpha activity in the posterior regions.  It blocks with eye opening and it is bilaterally synchronous and symmetrical.  No spike-and-wave discharges or any lateralizing abnormalities are seen.  Photic stimulation did not produce any abnormalities.  No abnormalities were found during the procedure. Stage I sleep was achieved.        ACTIVATION PROCEDURES:       Photic Stimulation were done     ICTAL AND/OR INTERICTAL FINDINGS:    No focal or generalized epileptiform activity noted. No regional slowing was seen during this routine study.  No clinical events or seizures were reported or recorded during the study.       EKG: sampling of the EKG recording demonstrated sinus rhythm.          INTERPRETATION:        ________________________________________________________________________     This is normal routine video EEG recording in the awake and drowsy/sleep state(s).     This scalp video EEG remains not remarkable     Of note, unremarkable EEG does not completely exclude the diagnosis  of seizures since seizure is an episodic phenomena and frontal lobe seizures could have normal scalp EEG. Clinical correlation may help     If clinical suspicion of seizure remains high.  Prolonged outpatient EEG   monitoring may be of help.     EEG 07/07/18 12:08  AM  ________________________________________________________________________           ____________________________________     MD LITA BOONE    DD:  07/07/2018 00:09:26  DT:  07/07/2018 00:31:27    D#:  [id number]  Job#:  [id number]

## 2018-07-07 NOTE — EEG PROGRESS NOTE
EEG 07/07/18 12:06 AM    ROUTINE VIDEO ELECTROENCEPHALOGRAM REPORT      NAME: Vilma Foster    REFERRING Dr: LORRIE    DURATION:  28 minutes    INDICATION: Acute Psychosis      TECHNIQUE: 30 channel routine electroencephalogram (EEG) was performed in accordance with the international 10-20 system. The study was reviewed in bipolar and referential montages. The recording examined the patient during wakeful and drowsy state(s).     DESCRIPTION OF THE RECORD:      Background rhythm during awake stage shows well-organized, well-developed, average voltage 10 to 11 hertz alpha activity in the posterior regions.  It blocks with eye opening and it is bilaterally synchronous and symmetrical.  No spike-and-wave discharges or any lateralizing abnormalities are seen.  Photic stimulation did not produce any abnormalities.  No abnormalities were found during the procedure. Stage I sleep was achieved.      ACTIVATION PROCEDURES:      Photic Stimulation were done    ICTAL AND/OR INTERICTAL FINDINGS:    No focal or generalized epileptiform activity noted. No regional slowing was seen during this routine study.  No clinical events or seizures were reported or recorded during the study.      EKG: sampling of the EKG recording demonstrated sinus rhythm.        INTERPRETATION:      ________________________________________________________________________    This is normal routine video EEG recording in the awake and drowsy/sleep state(s).    This scalp video EEG remains not remarkable    Of note, unremarkable EEG does not completely exclude the diagnosis  of seizures since seizure is an episodic phenomena and frontal lobe seizures could have normal scalp EEG. Clinical correlation may help     If clinical suspicion of seizure remains high.  Prolonged outpatient EEG   monitoring may be of help.    EEG 07/07/18 12:08 AM  ________________________________________________________________________

## 2018-07-07 NOTE — PROGRESS NOTES
" Asked pt one time if she was alright and pt responded, \"yep.\" Other than that, pt was non verbal.  Pt would not state her name when asked. No outburst during the night. Pt remains incontinent. Pt refused medications and refused any thing to eat or drink. Hourly rounding in place. Bed lowered, locked and call light within reach. Pt near nurses desk. Restraints were not used this shift.  "

## 2018-07-07 NOTE — PROGRESS NOTES
Patient resting in bed at this time, not trying to climb out of bed. Is calm and occasionally sad and weepy. Daughter is at bedside now talking with patient and showing photos from home. Patient is not saying anything and is not making good eye contact. Patient is not showing signs of pain on non-verbal pain scale. Is a full assist with breakfast this morning, needs encouragement and feeding or else patient will not eat or drink. No restraints on at this time, only bed and chair alarms for fall protocol and precautions. At this time patient is not able to make needs known. Turning and repositioning every two hours and checking for incontinence and offering toilet PRN. Will continue to monitor. Daughter at bedside is concerned about intake and is questioning if patient needs to be started on nutrition or IV fluids. Continue to encourage oral intake often at this time.

## 2018-07-07 NOTE — PROGRESS NOTES
[facility] Hospitalist Progress Note    Date of Service: 2018    Chief Complaint  53 y.o. female transferred from [facility]  7/3/2018 with Aloc psychosis, aggressive behavior to family, left hearing loss.     Interval Problem Update    Remains encephalopathic. Elevated CRP and anti TPO atbs.  Calmer overnight.     Consultants/Specialty    Psychiatry     Disposition    TBD        Review of Systems   Unable to perform ROS: Medical condition   Constitutional:        Awake, flat affect.       Physical Exam  Laboratory/Imaging   Hemodynamics  Temp (24hrs), Av.4 °C (97.6 °F), Min:36.2 °C (97.1 °F), Max:36.8 °C (98.3 °F)   Temperature: 36.2 °C (97.2 °F)  Pulse  Av.7  Min: 66  Max: 105    Blood Pressure: 131/67      Respiratory      Respiration: 19, Pulse Oximetry: 95 %             Fluids    Intake/Output Summary (Last 24 hours) at 18 1537  Last data filed at 18 0800   Gross per 24 hour   Intake              100 ml   Output                0 ml   Net              100 ml       Nutrition  Orders Placed This Encounter   Procedures   • Diet Order Regular (Plastic utensils only)     Standing Status:   Standing     Number of Occurrences:   1     Order Specific Question:   Diet:     Answer:   Regular [1]     Comments:   Plastic utensils only     Physical Exam   Constitutional: No distress.   Awake , Flat affect. Not following commands.   HENT:   Head: Normocephalic and atraumatic.   Right Ear: External ear normal.   Left Ear: External ear normal.   Nose: Nose normal.   Eyes: EOM are normal. Right eye exhibits no discharge. Left eye exhibits no discharge. No scleral icterus.   Neck: Neck supple. No JVD present.   Cardiovascular: Normal rate and regular rhythm.    No murmur heard.  Pulmonary/Chest: No stridor. She has no wheezes. She has no rales.   Abdominal: Soft. Bowel sounds are normal. She exhibits no distension. There is no tenderness.   Obese.    Musculoskeletal: She exhibits no edema or  tenderness.   Neurological:   Limited not following commands. Moves all extremities sporadically .    Skin: Skin is warm and dry. She is not diaphoretic. No pallor.   Psychiatric: Her affect is blunt. She is inattentive.   Vitals reviewed.          Recent Labs      07/07/18   0231   SODIUM  138   POTASSIUM  4.1   CHLORIDE  107   CO2  24   GLUCOSE  109*   BUN  21   CREATININE  1.04   CALCIUM  9.0     Recent Labs      07/05/18   0939   INR  1.06                  Assessment/Plan     Acute psychosis- (present on admission)   Assessment & Plan    Concern for Encephalitis--elevated ESR , anti TPO Atbs suggest Hashimotos Encephalitis.   Start IV solumedrol 1 gm IV daily .  Trial of IV VPA-- monitor clinically response.   Discussed with neurology Dr. Sequeira - plan additional studies with [place] panel.    Fu Csf studies   Probably not HSV encephalitis, afebrile - Continue Emp IV acyclovir for now . Dc if PCR negative. .   Continue legal hold for lack of medical decision making capacity.  LIfe skills input appreciated .               Hypothyroidism- (present on admission)   Assessment & Plan    Increased Tsh.  T4 Nl--suggest subclinical hypothyroidism.           Hyperlipidemia- (present on admission)   Assessment & Plan    Not on medications , fu outpatient.         Hearing loss of left ear- (present on admission)   Assessment & Plan    MRI no acute findings.           Quality-Core Measures   Reviewed items::  Medications reviewed, Labs reviewed and Radiology images reviewed  Babcock catheter::  No Babcock  DVT prophylaxis - mechanical:  SCDs  Antibiotics:  Treating active infection/contamination beyond 24 hours perioperative coverage      Discussed with daughter and neurology plan of care.

## 2018-07-07 NOTE — PROGRESS NOTES
IMPRESSION:     1. Acute psychosis since June 2018-- Frontal Lobe Dysfunction-- bursts of anger, lack of ability to focus and distract herself ( like holding her family for long time --)   2. TPO antibody positive ( which itself might not be correlated with clinical manifestations though)     PLAN/RECOMMENDATIONS:    ________________________________________________________________________       Eyes open, no verbal output today  Yesterday, the patient kept stating stating  OK, could not really follow commands either  The patient was yelling in the midnight, at times became very agitative  Neurologist team was consulted to look for organic causes of this acute psychosis    Dr Sequeira will continue to follow this patient    Advice: try seizure medication even the routine EEG is negative? Try IV solumedrol 3 days?  These will be decided by Dr Sequeira ( Scalp EEG is not sensitive in detecting frontal lobe dysfunction)    ________________________________________________________________________        Vitals:    07/06/18 1200 07/06/18 1300 07/06/18 1600 07/06/18 2000   BP:  153/65 154/77 150/85   Pulse: 96 77 89 95   Resp: (!) 25 18 18 18   Temp: 36.4 °C (97.6 °F) 37.2 °C (98.9 °F) 36.2 °C (97.1 °F) 36.8 °C (98.3 °F)   SpO2: 95% 97% 98% 91%   Weight:       Height:         Physical Exam   Constitutional: She is well-developed, well-nourished, and in no distress.   HENT:   Head: Normocephalic.   Eyes: Pupils are equal, round, and reactive to light.   Pulmonary/Chest: Effort normal.   Abdominal: Soft.   Musculoskeletal: Normal range of motion.   Neurological: She is alert.   abulia   Skin: Skin is warm.     Review of Systems   Eyes: Negative for discharge.   Gastrointestinal: Negative for blood in stool.   Genitourinary: Negative for hematuria.   Musculoskeletal: Positive for falls.   Skin: Positive for rash.   Neurological: Positive for speech change.   Psychiatric/Behavioral: Positive for hallucinations.       MRI of brain -  negative    Results for JESSICA HOANG (MRN [id number]) as of 7/6/2018 23:59   Ref. Range 7/3/2018 10:25 7/6/2018 10:25   Number Of Tubes Unknown  3   Volume Latest Units: mL  13.0   Color-Body Fluid Unknown  Colorless   Character-Body Fluid Unknown  Clear   Supernatant Appearance Unknown  Colorless   Total WBC Count Latest Ref Range: 0 - 10 cells/uL  3   Total RBC Count Latest Units: cells/uL  <1   Crenated RBC Latest Units: %  0   Lymphs Latest Units: %  89   Mononuclear Cells - CSF Latest Units: %  11   CSF Tube Number Unknown  3   Glucose CSF Latest Ref Range: 40 - 80 mg/dL 58    Total Protein, CSF Latest Ref Range: 15 - 45 mg/dL 37    Results for JESSICA HOANG (MRN [id number]) as of 7/6/2018 23:59   Ref. Range 7/6/2018 12:41 7/6/2018 12:42   Stat C-Reactive Protein Latest Ref Range: 0.00 - 0.75 mg/dL 0.84 (H)    Microsomal -Tpo- Abs Latest Ref Range: 0.0 - 9.0 IU/mL  311.6 (H)

## 2018-07-08 LAB
ANCA IGG TITR SER IF: NORMAL {TITER}
CARDIOLIPIN IGA SER IA-ACNC: 2 APL (ref 0–11)
CARDIOLIPIN IGG SER IA-ACNC: 4 GPL (ref 0–14)
CARDIOLIPIN IGM SER IA-ACNC: 10 MPL (ref 0–12)
CCP IGG SERPL-ACNC: 5 UNITS (ref 0–19)
EV RNA SPEC QL NAA+PROBE: NOT DETECTED
SPECIMEN SOURCE: NORMAL
TTG IGA SER IA-ACNC: 0 U/ML (ref 0–3)
WNV IGG CSF IA-ACNC: 0.04 IV
WNV IGM CSF IA-ACNC: 0 IV

## 2018-07-08 PROCEDURE — 700105 HCHG RX REV CODE 258: Performed by: HOSPITALIST

## 2018-07-08 PROCEDURE — 700102 HCHG RX REV CODE 250 W/ 637 OVERRIDE(OP): Performed by: HOSPITALIST

## 2018-07-08 PROCEDURE — 99232 SBSQ HOSP IP/OBS MODERATE 35: CPT | Performed by: HOSPITALIST

## 2018-07-08 PROCEDURE — 700111 HCHG RX REV CODE 636 W/ 250 OVERRIDE (IP): Performed by: HOSPITALIST

## 2018-07-08 PROCEDURE — 770006 HCHG ROOM/CARE - MED/SURG/GYN SEMI*

## 2018-07-08 PROCEDURE — 700105 HCHG RX REV CODE 258: Performed by: SPECIALIST

## 2018-07-08 PROCEDURE — 700111 HCHG RX REV CODE 636 W/ 250 OVERRIDE (IP): Performed by: SPECIALIST

## 2018-07-08 PROCEDURE — A9270 NON-COVERED ITEM OR SERVICE: HCPCS | Performed by: HOSPITALIST

## 2018-07-08 RX ADMIN — VALPROATE SODIUM 500 MG: 100 INJECTION, SOLUTION INTRAVENOUS at 05:00

## 2018-07-08 RX ADMIN — SODIUM CHLORIDE 1000 MG: 9 INJECTION, SOLUTION INTRAVENOUS at 09:34

## 2018-07-08 RX ADMIN — STANDARDIZED SENNA CONCENTRATE AND DOCUSATE SODIUM 2 TABLET: 8.6; 5 TABLET, FILM COATED ORAL at 19:48

## 2018-07-08 RX ADMIN — ACYCLOVIR SODIUM 455 MG: 500 INJECTION, SOLUTION INTRAVENOUS at 04:48

## 2018-07-08 RX ADMIN — STANDARDIZED SENNA CONCENTRATE AND DOCUSATE SODIUM 2 TABLET: 8.6; 5 TABLET, FILM COATED ORAL at 09:33

## 2018-07-08 RX ADMIN — RISPERIDONE 1 MG: 0.5 TABLET ORAL at 19:47

## 2018-07-08 RX ADMIN — VALPROATE SODIUM 500 MG: 100 INJECTION, SOLUTION INTRAVENOUS at 17:55

## 2018-07-08 ASSESSMENT — COGNITIVE AND FUNCTIONAL STATUS - GENERAL
DRESSING REGULAR UPPER BODY CLOTHING: TOTAL
HELP NEEDED FOR BATHING: TOTAL
MOVING FROM LYING ON BACK TO SITTING ON SIDE OF FLAT BED: UNABLE
MOVING TO AND FROM BED TO CHAIR: UNABLE
DRESSING REGULAR LOWER BODY CLOTHING: TOTAL
MOBILITY SCORE: 8
PERSONAL GROOMING: TOTAL
DAILY ACTIVITIY SCORE: 6
TOILETING: TOTAL
EATING MEALS: TOTAL
CLIMB 3 TO 5 STEPS WITH RAILING: TOTAL
TURNING FROM BACK TO SIDE WHILE IN FLAT BAD: UNABLE
SUGGESTED CMS G CODE MODIFIER MOBILITY: CM
WALKING IN HOSPITAL ROOM: A LOT
SUGGESTED CMS G CODE MODIFIER DAILY ACTIVITY: CN
STANDING UP FROM CHAIR USING ARMS: A LOT

## 2018-07-08 NOTE — PROGRESS NOTES
Pt resting comfortably in bed, turned q 2 hours, still not following commands or responsive, however, will open eyes but will not track, daughter was at bedside earlier today, she only ate a few bites for breakfast and nothing for lunch, has only taken a couple sips of water today, bed alarm is on and belongings and call light within reach.    Has not voided since being straight cathed at 0630, bladder scanned at 1500, showed 420 in bladder, Dr. Lakhani notified, was ordered to straight cath before end of shift if still has not voided.

## 2018-07-08 NOTE — PROGRESS NOTES
Neurology Progress Note               Author: ALTHEA Sequeira Date & Time created: 2018  3:11 PM     Interval History:  53 yr pt presented on  with a 1 week hx of confusion and psychotic behavior without prior history.  MRI, EEG and LP were neg.  Anti-tpo antibodies were markedly positive.  Started on solumedrol and valproate yesterday.  Autoimmune encephalitis panel is pending.    Review of Systems:  ROS    Physical Exam:  Physical Exam   Neurological:   Alert, tracks, no verbal output, follows intermittently.  PERRL, rx to visual threat.  Motor, DTR are =.       Labs:        Invalid input(s): [id number]      Recent Labs      18   1241  18   0231   SODIUM   --   138   POTASSIUM   --   4.1   CHLORIDE   --   107   CO2   --   24   BUN   --   21   CREATININE   --   1.04   MAGNESIUM  1.9   --    CALCIUM   --   9.0     Recent Labs      18   0231   GLUCOSE  109*     No results for input(s): RBC, HEMOGLOBIN, HEMATOCRIT, PLATELETCT, PROTHROMBTM, APTT, INR, IRON, FERRITIN, TOTIRONBC in the last 72 hours.      Recent Labs      18   0231   SODIUM  138   POTASSIUM  4.1   CHLORIDE  107   CO2  24   GLUCOSE  109*   BUN  21   CREATININE  1.04   CALCIUM  9.0     Hemodynamics:  Temp (24hrs), Av.4 °C (97.5 °F), Min:36.3 °C (97.3 °F), Max:36.7 °C (98 °F)  Temperature: 36.7 °C (98 °F)  Pulse  Av.6  Min: 66  Max: 105   Blood Pressure: 151/69     Respiratory:    Respiration: 20, Pulse Oximetry: 94 %           Fluids:    Intake/Output Summary (Last 24 hours) at 18 1511  Last data filed at 18 0651   Gross per 24 hour   Intake                0 ml   Output              930 ml   Net             -930 ml     Weight: 84.7 kg (186 lb 11.7 oz)  GI/Nutrition:  Orders Placed This Encounter   Procedures   • Diet Order Regular (Plastic utensils only)     Standing Status:   Standing     Number of Occurrences:   1     Order Specific Question:   Diet:     Answer:   Regular [1]     Comments:   Plastic  utensils only     Medical Decision Making, by Problem:  Active Hospital Problems    Diagnosis   • Acute psychosis [F23]   • Hearing loss of left ear [H91.92]   • Hypothyroidism [E03.9]   • Hyperlipidemia [E78.5]       Plan:  Continue steroids for suspected diagnosis of an autoimmune encephalitis, no different after 1 day.  Might consider EEG monitoring if no improvement, psych has seen.    Quality-Core Measures

## 2018-07-08 NOTE — PROGRESS NOTES
Ms. Foster tolerated last night's procedures / treatments without major incident however did have an issue with urinary retention. Patient voided at start of shift but failed to due so after 8 hours, thus patient was bladder scanned and found to have 750 cc of urine within bladder, patient was then given a bed bath in hopes that the warm water would aid patient in voiding, which she did however afterward patient found to have 420 cc of urine in bladder. Dr. BRITTANY Fernandez was called and made aware, order for In and out cath x1 received and entered into EPIC.    At 0600 patient bladder scanned and found to have 530 cc of urine in bladder. In and out cath inserted at approximately 0600 cc or urine removed.     At end of shift patient laying in bed, denies any complaints and does not display any signs of distress. Will endorse care to AM nurse.

## 2018-07-08 NOTE — PROGRESS NOTES
Lab came to draw blood from right hand, assisted by two RN's and one CNA at bedside. Patient became upset with blood draw and aggressive, back to withdrawn baseline after blood draw. Patient has not been verbal throughout shift, occasionally answering yes or no questions. IV to left forearm still intact and working, wrapped loosely with coban to protect. Daughter has left bedside. Patient watching TV at this time and holding stuffed animal. Bed alarm on at all times for fall precautions and confusion. Will encourage and assist with feeding for dinner.

## 2018-07-08 NOTE — PROGRESS NOTES
[facility] Hospitalist Progress Note    Date of Service: 2018    Chief Complaint  53 y.o. female transferred from [facility]  7/3/2018 with Aloc psychosis, aggressive behavior to family, left hearing loss.     Interval Problem Update    Remains awake but minimally responsive w blunt affect. Not answering questions- feeds with assist.     Consultants/Specialty    Psychiatry     Disposition    TBD        Review of Systems   Unable to perform ROS: Medical condition   Constitutional:        Awake, flat affect.       Physical Exam  Laboratory/Imaging   Hemodynamics  Temp (24hrs), Av.4 °C (97.5 °F), Min:36.3 °C (97.3 °F), Max:36.7 °C (98 °F)   Temperature: 36.7 °C (98 °F)  Pulse  Av.6  Min: 66  Max: 105    Blood Pressure: 151/69      Respiratory      Respiration: 20, Pulse Oximetry: 94 %             Fluids    Intake/Output Summary (Last 24 hours) at 18 1434  Last data filed at 18 0627   Gross per 24 hour   Intake                0 ml   Output              930 ml   Net             -930 ml       Nutrition  Orders Placed This Encounter   Procedures   • Diet Order Regular (Plastic utensils only)     Standing Status:   Standing     Number of Occurrences:   1     Order Specific Question:   Diet:     Answer:   Regular [1]     Comments:   Plastic utensils only     Physical Exam   Constitutional: No distress.   Awake, straight forward stare - doesnt follow commands or answer questions.    HENT:   Head: Normocephalic and atraumatic.   Right Ear: External ear normal.   Left Ear: External ear normal.   Nose: Nose normal.   Eyes: Right eye exhibits no discharge. Left eye exhibits no discharge. No scleral icterus.   Neck: Neck supple. No JVD present.   Cardiovascular: Normal rate and regular rhythm.    No murmur heard.  Pulmonary/Chest: No stridor. She has no wheezes. She has no rales.   Abdominal: Soft. Bowel sounds are normal. She exhibits no distension. There is no tenderness.   Obese.     Musculoskeletal: She exhibits no edema or tenderness.   Neurological:   Occasionally moves exts. Doesn't follow.    Skin: Skin is warm and dry. No pallor.   Psychiatric: Her affect is blunt. She is inattentive.   Vitals reviewed.          Recent Labs      07/07/18   0231   SODIUM  138   POTASSIUM  4.1   CHLORIDE  107   CO2  24   GLUCOSE  109*   BUN  21   CREATININE  1.04   CALCIUM  9.0                      Assessment/Plan     Acute psychosis- (present on admission)   Assessment & Plan    Concern for Encephalitis--elevated ESR , anti TPO Atbs suggest Hashimotos Encephalitis or autoimmune cause.   IV solumedrol and IV VPA-- monitor clinically response.    ? Psychiatric component- life skills following.   Plan additional workup with NMDA,    Florez panel.    HSV PCR negative-- dc  Emp IV acyclovir for now .   Continue legal hold for lack of medical decision making capacity.                 Hypothyroidism- (present on admission)   Assessment & Plan    Increased Tsh.  T4 Nl--suggest subclinical hypothyroidism.           Hyperlipidemia- (present on admission)   Assessment & Plan    Not on medications , fu outpatient.         Hearing loss of left ear- (present on admission)   Assessment & Plan    MRI no acute findings.           Quality-Core Measures   Reviewed items::  Medications reviewed, Labs reviewed and Radiology images reviewed  Babcock catheter::  No Babcock  DVT prophylaxis - mechanical:  SCDs  Antibiotics:  Treating active infection/contamination beyond 24 hours perioperative coverage

## 2018-07-09 LAB
APTT PPP: 28.6 SEC (ref 24.7–36)
BACTERIA CSF CULT: NORMAL
GRAM STN SPEC: NORMAL
INR PPP: 1.04 (ref 0.87–1.13)
LA PPP-IMP: NORMAL
OLIGOCLONAL BANDS CSF ELPH-IMP: NORMAL
OLIGOCLONAL BANDS CSF IEF: 0 BANDS (ref 0–1)
OLIGOCLONAL BANDS.IT SER+CSF QL: NEGATIVE
PROTHROMBIN TIME: 13.3 SEC (ref 12–14.6)
SCREEN DRVVT: 43.7 SEC (ref 28–48)
SIGNIFICANT IND 70042: NORMAL
SITE SITE: NORMAL
SOURCE SOURCE: NORMAL
SPECIMEN SOURCE: NORMAL
TEST NAME 95000: NORMAL
VIT B1 BLD-MCNC: 72 NMOL/L (ref 70–180)
VIT B6 SERPL-MCNC: 20.4 NMOL/L (ref 20–125)
VZV DNA SPEC QL NAA+PROBE: NOT DETECTED

## 2018-07-09 PROCEDURE — 700105 HCHG RX REV CODE 258: Performed by: HOSPITALIST

## 2018-07-09 PROCEDURE — 700111 HCHG RX REV CODE 636 W/ 250 OVERRIDE (IP): Performed by: SPECIALIST

## 2018-07-09 PROCEDURE — A9270 NON-COVERED ITEM OR SERVICE: HCPCS | Performed by: HOSPITALIST

## 2018-07-09 PROCEDURE — 700111 HCHG RX REV CODE 636 W/ 250 OVERRIDE (IP): Performed by: HOSPITALIST

## 2018-07-09 PROCEDURE — 770001 HCHG ROOM/CARE - MED/SURG/GYN PRIV*

## 2018-07-09 PROCEDURE — 700102 HCHG RX REV CODE 250 W/ 637 OVERRIDE(OP): Performed by: HOSPITALIST

## 2018-07-09 PROCEDURE — 700105 HCHG RX REV CODE 258: Performed by: SPECIALIST

## 2018-07-09 PROCEDURE — 99232 SBSQ HOSP IP/OBS MODERATE 35: CPT | Performed by: HOSPITALIST

## 2018-07-09 RX ADMIN — VALPROATE SODIUM 500 MG: 100 INJECTION, SOLUTION INTRAVENOUS at 17:47

## 2018-07-09 RX ADMIN — RISPERIDONE 1 MG: 0.5 TABLET ORAL at 19:17

## 2018-07-09 RX ADMIN — STANDARDIZED SENNA CONCENTRATE AND DOCUSATE SODIUM 2 TABLET: 8.6; 5 TABLET, FILM COATED ORAL at 19:17

## 2018-07-09 RX ADMIN — SODIUM CHLORIDE 1000 MG: 9 INJECTION, SOLUTION INTRAVENOUS at 08:13

## 2018-07-09 RX ADMIN — VALPROATE SODIUM 500 MG: 100 INJECTION, SOLUTION INTRAVENOUS at 05:00

## 2018-07-09 NOTE — PROGRESS NOTES
[facility] Hospitalist Progress Note    Date of Service: 2018    Chief Complaint  53 y.o. female transferred from [facility]  7/3/2018 with Aloc psychosis, aggressive behavior to family, left hearing loss.     Interval Problem Update    Possible Autoimmune Encephalitis on IV solumedrol.  Little more responsive according to daughter at bedside - Inconsistently whispers, tracks.      Consultants/Specialty    Psychiatry   Neurology.     Disposition    TBD        Review of Systems   Unable to perform ROS: Medical condition   Constitutional:        Awake, flat affect.       Physical Exam  Laboratory/Imaging   Hemodynamics  Temp (24hrs), Av.3 °C (97.4 °F), Min:36.1 °C (97 °F), Max:36.6 °C (97.9 °F)   Temperature: 36.1 °C (97 °F)  Pulse  Av.4  Min: 66  Max: 105    Blood Pressure: 121/65      Respiratory      Respiration: 20, Pulse Oximetry: 94 %             Fluids    Intake/Output Summary (Last 24 hours) at 18 1630  Last data filed at 18 1200   Gross per 24 hour   Intake              120 ml   Output              800 ml   Net             -680 ml       Nutrition  Orders Placed This Encounter   Procedures   • Diet Order Regular (Plastic utensils only)     Standing Status:   Standing     Number of Occurrences:   1     Order Specific Question:   Diet:     Answer:   Regular [1]     Comments:   Plastic utensils only     Physical Exam   Constitutional: No distress.   Awake, poorly responsive- at times answers in soft, non sensical replies    HENT:   Head: Normocephalic and atraumatic.   Right Ear: External ear normal.   Left Ear: External ear normal.   Nose: Nose normal.   Eyes: Right eye exhibits no discharge. Left eye exhibits no discharge. No scleral icterus.   Neck: Neck supple. No JVD present.   Cardiovascular: Normal rate and regular rhythm.    No murmur heard.  Pulmonary/Chest: No stridor. She has no wheezes. She has no rales.   Abdominal: Soft. Bowel sounds are normal. She exhibits no  distension. There is no tenderness.   Obese.    Musculoskeletal: She exhibits no edema or tenderness.   Neurological:   Occasionally moves exts. Doesn't follow.    Skin: Skin is warm and dry. No pallor.   Psychiatric: Her affect is blunt. Her speech is delayed. She is slowed. She is inattentive.   Vitals reviewed.          Recent Labs      07/07/18   0231   SODIUM  138   POTASSIUM  4.1   CHLORIDE  107   CO2  24   GLUCOSE  109*   BUN  21   CREATININE  1.04   CALCIUM  9.0     Recent Labs      07/07/18   1811   APTT  28.6   INR  1.04                  Assessment/Plan     Acute psychosis- (present on admission)   Assessment & Plan    Concern for Encephalitis--elevated ESR , anti TPO Atbs suggest Hashimotos Encephalitis or autoimmune cause.   Monitor for response on  IV solumedrol and VPA.  Clinically look about the same.  Daughter reports slightly better.   ? Psychiatric component- life skills consulted.  Fu workup NMDA atb  /   Coin panel.    Continue legal hold for lack of medical decision making capacity.                 Hypothyroidism- (present on admission)   Assessment & Plan    Increased Tsh.  T4 Nl--suggest subclinical hypothyroidism.           Hyperlipidemia- (present on admission)   Assessment & Plan    Not on medications , fu outpatient.         Hearing loss of left ear- (present on admission)   Assessment & Plan    MRI no acute findings.           Quality-Core Measures   Reviewed items::  Medications reviewed, Labs reviewed and Radiology images reviewed  Babcock catheter::  No Babcock  DVT prophylaxis - mechanical:  SCDs  Antibiotics:  Treating active infection/contamination beyond 24 hours perioperative coverage

## 2018-07-09 NOTE — DISCHARGE PLANNING
Anticipated Discharge Disposition: TBD    Action: RN CM faxed legal hold paperwork to [facility]    Barriers to Discharge: medical clearance    Plan: Assist with discharge planning

## 2018-07-09 NOTE — DISCHARGE PLANNING
Anticipated Discharge Disposition: TBD    Action: Dtr Nia filled out KAREN paperwork for FMLA.  KAREN faxed to [employer]     Barriers to Discharge: Medical clearance    Plan: Assist with discharge needs.

## 2018-07-09 NOTE — PROGRESS NOTES
Assumed care of patient at 1900. Patient only saying one of two words. Patient took medications fine , crushed in pudding. Later on in shift patient oriented x3, up to bedside commode; had BM and urinated. Patient back to speaking only a word or two this AM at this change. Turning in bed. VSS. Strip alarm in place and call bell at bedside. Patient placed near nurses station with door and blinds open for better observation. No other needs at this time.

## 2018-07-09 NOTE — PROGRESS NOTES
"Pt still not responding to commands, but is awake and alert, unable to assess orientation due to not speaking.  She responds to pain and will say \"ouch\", but will not respond to questions.  She got up to the bedside commode today with CNA and RN present with much effort, she was very resistant to standing up, but once she got her feet to the ground she was steady. She urinated about 400 cc, and we got her safely back to bed. VVS, daughter, Nia is now at bedside.  Pt. Refused breakfast and ate very little for lunch, dietician saw pt and has suggested supplements.  "

## 2018-07-09 NOTE — CARE PLAN
Problem: Nutritional:  Goal: Achieve adequate nutritional intake  Patient will consume >50% of meals   Outcome: PROGRESSING SLOWER THAN EXPECTED  Per chart, pt consuming 0%-50%.  Spoke with RN on adding nutritional supplements - pt now receiving Magic Cups twice a day and Boost Plus once a day to optimize nutrition.  Pt may also benefit from 1:1 feeding.  RD will continue to monitor to ensure pt is receiving adequate nutrition.

## 2018-07-10 ENCOUNTER — APPOINTMENT (OUTPATIENT)
Dept: RADIOLOGY | Facility: MEDICAL CENTER | Age: 53
DRG: 071 | End: 2018-07-10
Attending: INTERNAL MEDICINE
Payer: MEDICAID

## 2018-07-10 PROBLEM — G93.40 ACUTE ENCEPHALOPATHY: Status: ACTIVE | Noted: 2018-07-02

## 2018-07-10 LAB
ANION GAP SERPL CALC-SCNC: 10 MMOL/L (ref 0–11.9)
BASOPHILS # BLD AUTO: 0.1 % (ref 0–1.8)
BASOPHILS # BLD: 0.02 K/UL (ref 0–0.12)
BUN SERPL-MCNC: 33 MG/DL (ref 8–22)
CALCIUM SERPL-MCNC: 9.4 MG/DL (ref 8.5–10.5)
CHLORIDE SERPL-SCNC: 107 MMOL/L (ref 96–112)
CO2 SERPL-SCNC: 24 MMOL/L (ref 20–33)
CREAT SERPL-MCNC: 0.91 MG/DL (ref 0.5–1.4)
DSDNA AB TITR SER CLIF: ABNORMAL {TITER}
ENA SM IGG SER-ACNC: 0 AU/ML (ref 0–40)
ENA SS-B IGG SER IA-ACNC: 0 AU/ML (ref 0–40)
EOSINOPHIL # BLD AUTO: 0 K/UL (ref 0–0.51)
EOSINOPHIL NFR BLD: 0 % (ref 0–6.9)
ERYTHROCYTE [DISTWIDTH] IN BLOOD BY AUTOMATED COUNT: 42.5 FL (ref 35.9–50)
FOLATE SERPL-MCNC: 10.6 NG/ML
GLUCOSE BLD-MCNC: 197 MG/DL (ref 65–99)
GLUCOSE SERPL-MCNC: 133 MG/DL (ref 65–99)
HCT VFR BLD AUTO: 44.6 % (ref 37–47)
HGB BLD-MCNC: 14.6 G/DL (ref 12–16)
IMM GRANULOCYTES # BLD AUTO: 0.15 K/UL (ref 0–0.11)
IMM GRANULOCYTES NFR BLD AUTO: 1 % (ref 0–0.9)
LYMPHOCYTES # BLD AUTO: 1.3 K/UL (ref 1–4.8)
LYMPHOCYTES NFR BLD: 9.1 % (ref 22–41)
MCH RBC QN AUTO: 29 PG (ref 27–33)
MCHC RBC AUTO-ENTMCNC: 32.7 G/DL (ref 33.6–35)
MCV RBC AUTO: 88.5 FL (ref 81.4–97.8)
MONOCYTES # BLD AUTO: 0.26 K/UL (ref 0–0.85)
MONOCYTES NFR BLD AUTO: 1.8 % (ref 0–13.4)
NEUTROPHILS # BLD AUTO: 12.6 K/UL (ref 2–7.15)
NEUTROPHILS NFR BLD: 88 % (ref 44–72)
NRBC # BLD AUTO: 0.02 K/UL
NRBC BLD-RTO: 0.1 /100 WBC
NUCLEAR IGG SER QL IA: DETECTED
NUCLEAR IGG TITR SER IF: ABNORMAL {TITER}
PLATELET # BLD AUTO: 346 K/UL (ref 164–446)
PMV BLD AUTO: 10.8 FL (ref 9–12.9)
POTASSIUM SERPL-SCNC: 4 MMOL/L (ref 3.6–5.5)
RBC # BLD AUTO: 5.04 M/UL (ref 4.2–5.4)
SODIUM SERPL-SCNC: 141 MMOL/L (ref 135–145)
SSA52 R0ENA AB IGG Q0420: 8 AU/ML (ref 0–40)
SSA60 R0ENA AB IGG Q0419: 9 AU/ML (ref 0–40)
TREPONEMA PALLIDUM IGG+IGM AB [PRESENCE] IN SERUM OR PLASMA BY IMMUNOASSAY: NON REACTIVE
U1 SNRNP IGG SER QL: 0 AU/ML (ref 0–40)
WBC # BLD AUTO: 14.3 K/UL (ref 4.8–10.8)

## 2018-07-10 PROCEDURE — 700105 HCHG RX REV CODE 258: Performed by: INTERNAL MEDICINE

## 2018-07-10 PROCEDURE — 82962 GLUCOSE BLOOD TEST: CPT

## 2018-07-10 PROCEDURE — 80048 BASIC METABOLIC PNL TOTAL CA: CPT

## 2018-07-10 PROCEDURE — 82746 ASSAY OF FOLIC ACID SERUM: CPT

## 2018-07-10 PROCEDURE — 700102 HCHG RX REV CODE 250 W/ 637 OVERRIDE(OP): Performed by: HOSPITALIST

## 2018-07-10 PROCEDURE — 86780 TREPONEMA PALLIDUM: CPT

## 2018-07-10 PROCEDURE — 85025 COMPLETE CBC W/AUTO DIFF WBC: CPT

## 2018-07-10 PROCEDURE — 86341 ISLET CELL ANTIBODY: CPT

## 2018-07-10 PROCEDURE — 700111 HCHG RX REV CODE 636 W/ 250 OVERRIDE (IP): Performed by: SPECIALIST

## 2018-07-10 PROCEDURE — 36415 COLL VENOUS BLD VENIPUNCTURE: CPT

## 2018-07-10 PROCEDURE — A9270 NON-COVERED ITEM OR SERVICE: HCPCS | Performed by: HOSPITALIST

## 2018-07-10 PROCEDURE — 86255 FLUORESCENT ANTIBODY SCREEN: CPT | Mod: 91

## 2018-07-10 PROCEDURE — 700102 HCHG RX REV CODE 250 W/ 637 OVERRIDE(OP): Performed by: INTERNAL MEDICINE

## 2018-07-10 PROCEDURE — 700105 HCHG RX REV CODE 258: Performed by: HOSPITALIST

## 2018-07-10 PROCEDURE — 770001 HCHG ROOM/CARE - MED/SURG/GYN PRIV*

## 2018-07-10 PROCEDURE — 83519 RIA NONANTIBODY: CPT | Mod: 91

## 2018-07-10 PROCEDURE — A9270 NON-COVERED ITEM OR SERVICE: HCPCS | Performed by: INTERNAL MEDICINE

## 2018-07-10 PROCEDURE — 700111 HCHG RX REV CODE 636 W/ 250 OVERRIDE (IP): Performed by: HOSPITALIST

## 2018-07-10 PROCEDURE — 700105 HCHG RX REV CODE 258: Performed by: SPECIALIST

## 2018-07-10 PROCEDURE — 99231 SBSQ HOSP IP/OBS SF/LOW 25: CPT | Performed by: INTERNAL MEDICINE

## 2018-07-10 RX ORDER — DEXTROSE MONOHYDRATE 25 G/50ML
25 INJECTION, SOLUTION INTRAVENOUS
Status: DISCONTINUED | OUTPATIENT
Start: 2018-07-10 | End: 2018-07-11 | Stop reason: HOSPADM

## 2018-07-10 RX ORDER — POLYETHYLENE GLYCOL 3350 17 G/17G
1 POWDER, FOR SOLUTION ORAL 2 TIMES DAILY
Status: DISCONTINUED | OUTPATIENT
Start: 2018-07-10 | End: 2018-07-11 | Stop reason: HOSPADM

## 2018-07-10 RX ORDER — FAMOTIDINE 20 MG/1
20 TABLET, FILM COATED ORAL 2 TIMES DAILY
Status: DISCONTINUED | OUTPATIENT
Start: 2018-07-10 | End: 2018-07-11 | Stop reason: HOSPADM

## 2018-07-10 RX ORDER — SODIUM CHLORIDE, SODIUM LACTATE, POTASSIUM CHLORIDE, CALCIUM CHLORIDE 600; 310; 30; 20 MG/100ML; MG/100ML; MG/100ML; MG/100ML
INJECTION, SOLUTION INTRAVENOUS CONTINUOUS
Status: DISCONTINUED | OUTPATIENT
Start: 2018-07-10 | End: 2018-07-11 | Stop reason: HOSPADM

## 2018-07-10 RX ADMIN — SODIUM CHLORIDE, POTASSIUM CHLORIDE, SODIUM LACTATE AND CALCIUM CHLORIDE: 600; 310; 30; 20 INJECTION, SOLUTION INTRAVENOUS at 22:39

## 2018-07-10 RX ADMIN — VALPROATE SODIUM 500 MG: 100 INJECTION, SOLUTION INTRAVENOUS at 05:12

## 2018-07-10 RX ADMIN — SODIUM CHLORIDE 1000 MG: 9 INJECTION, SOLUTION INTRAVENOUS at 06:25

## 2018-07-10 RX ADMIN — FAMOTIDINE 20 MG: 20 TABLET ORAL at 21:38

## 2018-07-10 RX ADMIN — VALPROATE SODIUM 500 MG: 100 INJECTION, SOLUTION INTRAVENOUS at 17:10

## 2018-07-10 RX ADMIN — RISPERIDONE 1 MG: 0.5 TABLET ORAL at 17:10

## 2018-07-10 NOTE — PROGRESS NOTES
Assumed care of patient at 1900. Patient alert to self and seldomly speaking. Patient up with one assist to bedside commode overnight to urinate. Throughout night patient became more alert & oriented x3. Turned Q2. Strip alarm in place and call bell at bedside. VSS. No other needs.

## 2018-07-10 NOTE — PROGRESS NOTES
S- no pain.  No other complaints.     O-   Allergies: No Known Allergies      Current Facility-Administered Medications:   •  methylPREDNISolone sod succ (SOLU-MEDROL) 1,000 mg in  mL IVPB, 1 g, Intravenous, DAILY, Sky Lakhani M.D., Stopped at 07/09/18 0913  •  valproate (DEPACON) 500 mg in D5W 50 mL IVPB, 500 mg, Intravenous, BID, ALTHEA Sequeira M.D., Stopped at 07/09/18 1847  •  risperiDONE (RISPERDAL) tablet 1 mg, 1 mg, Oral, Q EVENING, Sky Lakhani M.D., 1 mg at 07/09/18 1917  •  LORazepam (ATIVAN) injection 1 mg, 1 mg, Intravenous, Q4HRS PRN, Tyler Coffey D.O., 1 mg at 07/05/18 2127  •  ondansetron (ZOFRAN ODT) dispertab 4 mg, 4 mg, Oral, Q4HRS PRN, Randy Aguilar M.D.  •  ondansetron (ZOFRAN) syringe/vial injection 4 mg, 4 mg, Intravenous, Q4HRS PRN, Randy Aguilar M.D.  •  senna-docusate (PERICOLACE or SENOKOT S) 8.6-50 MG per tablet 2 Tab, 2 Tab, Oral, BID, 2 Tab at 07/09/18 1917 **AND** polyethylene glycol/lytes (MIRALAX) PACKET 1 Packet, 1 Packet, Oral, QDAY PRN **AND** magnesium hydroxide (MILK OF MAGNESIA) suspension 30 mL, 30 mL, Oral, QDAY PRN **AND** bisacodyl (DULCOLAX) suppository 10 mg, 10 mg, Rectal, QDAY PRN, Randy Aguilar M.D.  •  acetaminophen (TYLENOL) tablet 650 mg, 650 mg, Oral, Q6HRS PRN, Randy Aguilar M.D., 650 mg at 07/03/18 1519      PHYSICAL EXAM    Vitals:    07/08/18 2031 07/09/18 0450 07/09/18 0800 07/09/18 1630   BP: 153/89 143/82 121/65 148/60   Pulse: 84 76 76 87   Resp: 16 18 20 20   Temp: 36.6 °C (97.9 °F) 36.2 °C (97.2 °F) 36.1 °C (97 °F) 37.6 °C (99.6 °F)   SpO2: 94% 95% 94% 96%   Weight:       Height:           Head/Neck: NCAT. no meningismus neg kernig neg brudzinski. No obvious mass or heard bruit. No tender arteries or lost pulses. No rash of head or neck.    Skin: Warm, dry, intact. No rashes observed head/neck or body    Eyes/Funduscopic: unable    Mental Status: Awake, alert, oriented to name. Slow follows commands. No neglect/extinction. Attention and  concentration, recent & remote memory, Fund of Knowledge compromised.  Does recognize granddaughter pic as alisson and as granddaughter.    Cranial Nerves: CN II-XII intact. PERRL 4mm.   No afferent pupillary defect. EOM full. VF full. No nystagmus.       Motor: bulk & tone wnl.  spon intact and sym, no abn mvmts     Sensory: symmetric to pain    Coordination: dysmetria absent     DTR's: intact/sym. no clonus. Toes mute.    Gait/Station: n/a fall concern      Labs:      Recent Labs      07/07/18   0231   SODIUM  138   POTASSIUM  4.1   CHLORIDE  107   CO2  24   GLUCOSE  109*   BUN  21   CREATININE  1.04   CALCIUM  9.0     Recent Labs      07/07/18   1811   APTT  28.6   INR  1.04                 Recent Labs      07/07/18   0231   SODIUM  138   POTASSIUM  4.1   CHLORIDE  107   CO2  24   GLUCOSE  109*   BUN  21     Recent Labs      07/07/18   0231   SODIUM  138   POTASSIUM  4.1   CHLORIDE  107   CO2  24   BUN  21   CREATININE  1.04   CALCIUM  9.0     Recent Labs      07/07/18   1811   APTT  28.6   INR  1.04     No results found for this or any previous visit.           Imaging: neuroimaging reviewed and directly visualized by me  MR-BRAIN-WITH & W/O   Final Result      MRI of the brain without and with contrast within normal limits.      DX-LUMBAR PUNCTURE FOR DIAGNOSIS   Final Result      Fluoroscopic-guided lumbar puncture as described above.          Assessment/Plan:    Encephalopathy, suspect autoimmune possibly hashimotos .6 highly elevated, anti TG wnl    Steroid high dose  Rheum consult, may need PLEX or IVIG  AI/paraneo panel pending      Hieu Lebron M.D.  , Neurohospitalist & Stroke  Clinical Professor, [employer]  Diplomate, Neurology & Neuroimaging
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
## 2018-07-10 NOTE — NON-PROVIDER
Internal Medicine Medical Student Note  Note Author: Harsh Ly, Student    Name Vilma Foster     1965   Age/Sex 53 y.o. female   MRN [id number]   Code Status FULL CODE             Reason for interval visit  (Principal Problem)   CC:  Patient initially admitted for acute psychosis, aggressive behavior, and L unilateral hearing loss.   Interval Problem Daily Status Update  (problem status, last 24 hours, new history, new data )     Subjective:   Patient is AOx3 to self, place, and time. However she in non-responsive and cannot answer why she is admitted. According to the overnight nurse, this improvement was noted last night as well. There is a marked improvement from my prior attempted assessment on 18, where she was non-responsive and unable to participate due apparent somnolence. She is able to track with eyes, make eye contact, and is responsive to most questions/commands asked of her.     Concerns from her daughter (Nia), and primary caregiver, at this time is concerned as to what options are available for her mother's care as she may not be able to continue being her caregiver in this condition.      Physical Exam       Vitals:    18 1630 07/09/18 2000 07/10/18 0400 07/10/18 0730   BP: 148/60 116/87 150/64 143/67   Pulse: 87 76 (!) 50 (!) 55   Resp: 20 20 16 20   Temp: 37.6 °C (99.6 °F) 36.9 °C (98.5 °F) 36.4 °C (97.5 °F) 37.1 °C (98.8 °F)   SpO2: 96% 95% 97% 97%   Weight:       Height:         Body mass index is 37.69 kg/m².    Oxygen Therapy:  Pulse Oximetry: 97 %, O2 (LPM): 0, O2 Delivery: None (Room Air)       Current Facility-Administered Medications:   •  methylPREDNISolone  •  valproate (DEPACON) IV  •  risperiDONE  •  LORazepam  •  ondansetron  •  ondansetron  •  senna-docusate **AND** polyethylene glycol/lytes **AND** magnesium hydroxide **AND** bisacodyl  •  acetaminophen    No known allergies    Physical Exam   Constitutional: She is oriented to person, place, and  time.   HENT:   Head: Normocephalic.   Nose: Nose normal.   Eyes: Conjunctivae and EOM are normal. Pupils are equal, round, and reactive to light.   Cardiovascular: Normal rate, regular rhythm and normal heart sounds.    Pulmonary/Chest: Effort normal and breath sounds normal.   Abdominal: Bowel sounds are normal.   Musculoskeletal:       Neurological: She is alert and oriented to person, place, and time. She displays weakness. No cranial nerve deficit or sensory deficit.   Psychiatric: Affect normal.   Appears confused and is unable to answer as to why she is in the hospital at this time.          Assessment/Plan     1. Suspected Hashimoto's Encephalopathy  TSH elevated at 7.020 7/1/2018  Anti-TPO elevated at 311.6 7/6/2018   Anti-TG within normal limits  Due to elevated anti-TPO and symptomology high reason to believe pt has Hashimoto's Encephalopathy.   Currently treating with high dose steroid and still waiting on rheumatology consult.  Al/jesenia panel pending.

## 2018-07-11 ENCOUNTER — APPOINTMENT (OUTPATIENT)
Dept: RADIOLOGY | Facility: MEDICAL CENTER | Age: 53
DRG: 071 | End: 2018-07-11
Attending: INTERNAL MEDICINE
Payer: MEDICAID

## 2018-07-11 VITALS
HEART RATE: 81 BPM | DIASTOLIC BLOOD PRESSURE: 67 MMHG | SYSTOLIC BLOOD PRESSURE: 146 MMHG | RESPIRATION RATE: 17 BRPM | OXYGEN SATURATION: 94 % | HEIGHT: 59 IN | BODY MASS INDEX: 37.64 KG/M2 | WEIGHT: 186.73 LBS | TEMPERATURE: 97.7 F

## 2018-07-11 LAB
ALBUMIN SERPL BCP-MCNC: 3 G/DL (ref 3.2–4.9)
ALBUMIN/GLOB SERPL: 1.2 G/DL
ALP SERPL-CCNC: 63 U/L (ref 30–99)
ALT SERPL-CCNC: 9 U/L (ref 2–50)
ANION GAP SERPL CALC-SCNC: 9 MMOL/L (ref 0–11.9)
APTT PPP: 24.5 SEC (ref 24.7–36)
AST SERPL-CCNC: 7 U/L (ref 12–45)
BASOPHILS # BLD AUTO: 0.1 % (ref 0–1.8)
BASOPHILS # BLD: 0.01 K/UL (ref 0–0.12)
BILIRUB SERPL-MCNC: 0.3 MG/DL (ref 0.1–1.5)
BUN SERPL-MCNC: 30 MG/DL (ref 8–22)
CALCIUM SERPL-MCNC: 8.9 MG/DL (ref 8.5–10.5)
CHLORIDE SERPL-SCNC: 106 MMOL/L (ref 96–112)
CO2 SERPL-SCNC: 23 MMOL/L (ref 20–33)
CREAT SERPL-MCNC: 0.83 MG/DL (ref 0.5–1.4)
EOSINOPHIL # BLD AUTO: 0 K/UL (ref 0–0.51)
EOSINOPHIL NFR BLD: 0 % (ref 0–6.9)
ERYTHROCYTE [DISTWIDTH] IN BLOOD BY AUTOMATED COUNT: 43.5 FL (ref 35.9–50)
GLOBULIN SER CALC-MCNC: 2.6 G/DL (ref 1.9–3.5)
GLUCOSE BLD-MCNC: 126 MG/DL (ref 65–99)
GLUCOSE BLD-MCNC: 205 MG/DL (ref 65–99)
GLUCOSE BLD-MCNC: 287 MG/DL (ref 65–99)
GLUCOSE SERPL-MCNC: 119 MG/DL (ref 65–99)
HAV IGM SERPL QL IA: NEGATIVE
HBV CORE IGM SER QL: NEGATIVE
HBV SURFACE AG SER QL: NEGATIVE
HCT VFR BLD AUTO: 39 % (ref 37–47)
HCV AB SER QL: NEGATIVE
HGB BLD-MCNC: 12.8 G/DL (ref 12–16)
HIV 1+2 AB+HIV1 P24 AG SERPL QL IA: NON REACTIVE
IMM GRANULOCYTES # BLD AUTO: 0.45 K/UL (ref 0–0.11)
IMM GRANULOCYTES NFR BLD AUTO: 3.4 % (ref 0–0.9)
INR PPP: 1.12 (ref 0.87–1.13)
LYMPHOCYTES # BLD AUTO: 1.26 K/UL (ref 1–4.8)
LYMPHOCYTES NFR BLD: 9.6 % (ref 22–41)
MAGNESIUM SERPL-MCNC: 2.1 MG/DL (ref 1.5–2.5)
MCH RBC QN AUTO: 29.2 PG (ref 27–33)
MCHC RBC AUTO-ENTMCNC: 32.8 G/DL (ref 33.6–35)
MCV RBC AUTO: 89 FL (ref 81.4–97.8)
MONOCYTES # BLD AUTO: 0.57 K/UL (ref 0–0.85)
MONOCYTES NFR BLD AUTO: 4.3 % (ref 0–13.4)
NEUTROPHILS # BLD AUTO: 10.88 K/UL (ref 2–7.15)
NEUTROPHILS NFR BLD: 82.6 % (ref 44–72)
NRBC # BLD AUTO: 0 K/UL
NRBC BLD-RTO: 0 /100 WBC
PHOSPHATE SERPL-MCNC: 2.8 MG/DL (ref 2.5–4.5)
PLATELET # BLD AUTO: 296 K/UL (ref 164–446)
PMV BLD AUTO: 11.1 FL (ref 9–12.9)
POTASSIUM SERPL-SCNC: 4.2 MMOL/L (ref 3.6–5.5)
PROT SERPL-MCNC: 5.6 G/DL (ref 6–8.2)
PROTHROMBIN TIME: 14.1 SEC (ref 12–14.6)
RBC # BLD AUTO: 4.38 M/UL (ref 4.2–5.4)
SODIUM SERPL-SCNC: 138 MMOL/L (ref 135–145)
WBC # BLD AUTO: 13.2 K/UL (ref 4.8–10.8)

## 2018-07-11 PROCEDURE — 700117 HCHG RX CONTRAST REV CODE 255: Performed by: INTERNAL MEDICINE

## 2018-07-11 PROCEDURE — 83735 ASSAY OF MAGNESIUM: CPT

## 2018-07-11 PROCEDURE — A9270 NON-COVERED ITEM OR SERVICE: HCPCS | Performed by: INTERNAL MEDICINE

## 2018-07-11 PROCEDURE — 80074 ACUTE HEPATITIS PANEL: CPT

## 2018-07-11 PROCEDURE — 97165 OT EVAL LOW COMPLEX 30 MIN: CPT

## 2018-07-11 PROCEDURE — 700102 HCHG RX REV CODE 250 W/ 637 OVERRIDE(OP): Performed by: INTERNAL MEDICINE

## 2018-07-11 PROCEDURE — 80053 COMPREHEN METABOLIC PANEL: CPT

## 2018-07-11 PROCEDURE — 85025 COMPLETE CBC W/AUTO DIFF WBC: CPT

## 2018-07-11 PROCEDURE — G8978 MOBILITY CURRENT STATUS: HCPCS | Mod: CJ

## 2018-07-11 PROCEDURE — 82962 GLUCOSE BLOOD TEST: CPT | Mod: 91

## 2018-07-11 PROCEDURE — G8979 MOBILITY GOAL STATUS: HCPCS | Mod: CI

## 2018-07-11 PROCEDURE — 700105 HCHG RX REV CODE 258: Performed by: SPECIALIST

## 2018-07-11 PROCEDURE — 700111 HCHG RX REV CODE 636 W/ 250 OVERRIDE (IP): Performed by: HOSPITALIST

## 2018-07-11 PROCEDURE — 99239 HOSP IP/OBS DSCHRG MGMT >30: CPT | Performed by: INTERNAL MEDICINE

## 2018-07-11 PROCEDURE — 84100 ASSAY OF PHOSPHORUS: CPT

## 2018-07-11 PROCEDURE — G8987 SELF CARE CURRENT STATUS: HCPCS | Mod: CK

## 2018-07-11 PROCEDURE — 97162 PT EVAL MOD COMPLEX 30 MIN: CPT

## 2018-07-11 PROCEDURE — 85610 PROTHROMBIN TIME: CPT

## 2018-07-11 PROCEDURE — G8988 SELF CARE GOAL STATUS: HCPCS | Mod: CI

## 2018-07-11 PROCEDURE — 700111 HCHG RX REV CODE 636 W/ 250 OVERRIDE (IP): Performed by: INTERNAL MEDICINE

## 2018-07-11 PROCEDURE — 36415 COLL VENOUS BLD VENIPUNCTURE: CPT

## 2018-07-11 PROCEDURE — 87389 HIV-1 AG W/HIV-1&-2 AB AG IA: CPT

## 2018-07-11 PROCEDURE — 85730 THROMBOPLASTIN TIME PARTIAL: CPT

## 2018-07-11 PROCEDURE — 700105 HCHG RX REV CODE 258: Performed by: HOSPITALIST

## 2018-07-11 PROCEDURE — 71260 CT THORAX DX C+: CPT

## 2018-07-11 PROCEDURE — 700111 HCHG RX REV CODE 636 W/ 250 OVERRIDE (IP): Performed by: SPECIALIST

## 2018-07-11 RX ORDER — RISPERIDONE 1 MG/1
1 TABLET ORAL EVERY EVENING
Qty: 60 TAB | Refills: 3 | Status: SHIPPED | OUTPATIENT
Start: 2018-07-11 | End: 2018-11-28 | Stop reason: CLARIF

## 2018-07-11 RX ORDER — LORAZEPAM 2 MG/ML
1 INJECTION INTRAMUSCULAR EVERY 4 HOURS PRN
Refills: 0
Start: 2018-07-11 | End: 2018-07-14

## 2018-07-11 RX ORDER — ACETAMINOPHEN 325 MG/1
650 TABLET ORAL EVERY 6 HOURS PRN
Qty: 30 TAB | Refills: 0 | Status: SHIPPED | OUTPATIENT
Start: 2018-07-11 | End: 2018-08-08

## 2018-07-11 RX ORDER — POLYETHYLENE GLYCOL 3350 17 G/17G
17 POWDER, FOR SOLUTION ORAL 2 TIMES DAILY
Refills: 3
Start: 2018-07-11 | End: 2018-08-08

## 2018-07-11 RX ORDER — FAMOTIDINE 20 MG/1
20 TABLET, FILM COATED ORAL 2 TIMES DAILY
Qty: 60 TAB
Start: 2018-07-11 | End: 2018-08-08

## 2018-07-11 RX ADMIN — IOHEXOL 100 ML: 350 INJECTION, SOLUTION INTRAVENOUS at 14:24

## 2018-07-11 RX ADMIN — FAMOTIDINE 20 MG: 20 TABLET ORAL at 06:02

## 2018-07-11 RX ADMIN — VALPROATE SODIUM 500 MG: 100 INJECTION, SOLUTION INTRAVENOUS at 17:33

## 2018-07-11 RX ADMIN — SODIUM CHLORIDE 1000 MG: 9 INJECTION, SOLUTION INTRAVENOUS at 04:07

## 2018-07-11 RX ADMIN — FAMOTIDINE 20 MG: 20 TABLET ORAL at 17:31

## 2018-07-11 RX ADMIN — LORAZEPAM 1 MG: 2 INJECTION INTRAMUSCULAR; INTRAVENOUS at 19:07

## 2018-07-11 RX ADMIN — VALPROATE SODIUM 500 MG: 100 INJECTION, SOLUTION INTRAVENOUS at 05:49

## 2018-07-11 ASSESSMENT — PAIN SCALES - GENERAL
PAINLEVEL_OUTOF10: 0

## 2018-07-11 ASSESSMENT — COGNITIVE AND FUNCTIONAL STATUS - GENERAL
WALKING IN HOSPITAL ROOM: A LITTLE
TURNING FROM BACK TO SIDE WHILE IN FLAT BAD: A LITTLE
EATING MEALS: A LITTLE
PERSONAL GROOMING: A LITTLE
MOVING FROM LYING ON BACK TO SITTING ON SIDE OF FLAT BED: UNABLE
DRESSING REGULAR UPPER BODY CLOTHING: A LITTLE
SUGGESTED CMS G CODE MODIFIER MOBILITY: CL
MOVING TO AND FROM BED TO CHAIR: UNABLE
DAILY ACTIVITIY SCORE: 17
MOBILITY SCORE: 12
CLIMB 3 TO 5 STEPS WITH RAILING: TOTAL
SUGGESTED CMS G CODE MODIFIER DAILY ACTIVITY: CK
HELP NEEDED FOR BATHING: A LOT
STANDING UP FROM CHAIR USING ARMS: A LITTLE
TOILETING: A LITTLE
DRESSING REGULAR LOWER BODY CLOTHING: A LITTLE

## 2018-07-11 ASSESSMENT — GAIT ASSESSMENTS
DEVIATION: INCREASED BASE OF SUPPORT;SHUFFLED GAIT
GAIT LEVEL OF ASSIST: CONTACT GUARD ASSIST
ASSISTIVE DEVICE: FRONT WHEEL WALKER
DISTANCE (FEET): 30

## 2018-07-11 ASSESSMENT — PATIENT HEALTH QUESTIONNAIRE - PHQ9
SUM OF ALL RESPONSES TO PHQ9 QUESTIONS 1 AND 2: 0
1. LITTLE INTEREST OR PLEASURE IN DOING THINGS: NOT AT ALL

## 2018-07-11 ASSESSMENT — ACTIVITIES OF DAILY LIVING (ADL): TOILETING: INDEPENDENT

## 2018-07-11 NOTE — DISCHARGE PLANNING
[place] [employer] has approved [facility] transfer. Dr. Gaines has accepted. [facility] transport. Pt going to unit [address]. Report call number is [phone]. APOLINAR Corona CM on S1 has been notified. Film room will burn films to disc.

## 2018-07-11 NOTE — DIETARY
Nutrition Services: Update   Pt is currently on regular diet. Pt is receiving magic cups BID with breakfast and dinner, Boost Plus with lunch and pudding as 2 pm snack. Attempted to visit with pt, pt was sleeping and would not arouse. Per chart pt PO 50-75% 3 meals yesterday and % of breakfast this morning. Pt PO intake has improved. Wt 7/7: 84.7 kg via bed scale.    Recommendations/Plan:  1. Encourage intake of meals  2. Document intake of all meals as % taken in ADL's to provide interdisciplinary communication across all shifts.   3. Monitor weight.  4. Nutrition rep will continue to see patient for ongoing meal and snack preferences.    RD following

## 2018-07-11 NOTE — THERAPY
"Physical Therapy Evaluation completed.   Bed Mobility:  Supine to Sit: Stand by Assist  Transfers: Sit to Stand: Contact Guard Assist  Gait: Level Of Assist: Contact Guard Assist with Front-Wheel Walker       Plan of Care: Will benefit from Physical Therapy 3 times per week  Discharge Recommendations: Equipment: Will Continue to Assess for Equipment Needs. Post-acute therapy Discharge to a transitional care facility for continued skilled therapy services.    See \"Rehab Therapy-Acute\" Patient Summary Report for complete documentation.     "

## 2018-07-11 NOTE — PROGRESS NOTES
Patient seen and examined with MAREK Benitez.   I agree with the examination and assessment / plan as listed below with changes/addendum as listed below.     Transferred from [facility] for further evaluation.    MRI of the brain within normal limits.    Thus far patient has had a negative workup apart from elevated microsomal TPO antibodies.  In addition positive CODY.  Minimally elevated CRP and ESR within normal limits.    Pertinent issues at this time include the following    #1  Encephalopathy which appears improved at this point in time.  Patient has been initiated on high-dose steroids by neurology team.  At this time I would recommend initiation of Pepcid and sliding scale insulin therapy for patient while patient is on pulse dose steroids.  To note we were asked by neurology team to consult rheumatology and endocrinology.  I discussed the case with Dr. Alvarado from rheumatology who is on-call for the hospital, she reported given the patient has a negative lumbar puncture, negative MRI of the brain with concern for elevated thyroid peroxidase she would not be providing recommendations for immunosuppressive therapy and defer recommendations to endocrinology team.  Subsequently I discussed the case with Dr Simpson from endocrinology and he reported that he does not manage immunosuppression, rarely evaluate Hashimoto's thyroiditis/encephalopathy and defers recommendations to neurology team.  I informed Dr. Lebron regarding above input from rheumatology and Endocrinology.  In addition I asked the transfer center to start coordinating with other centers including [facility], [place], [facility] to consider transfer to a tertiary center given inability to coordinate rheumatology and endocrinology consultations at our center.  This is awaited.  Rheumatology recommended hypercoagulable workup, previously negative lupus anticoagulant and anticardiolipin antibodies.  B2 glycoprotein antibodies can be  obtained, recommend discussing with neurology tomorrow to see if they recommend proceeding with this evaluation and if they recommend further evaluation by hematology team for prothrombotic workup and if that is the case hematology consultation can be requested.  Otherwise negative workup thus far.  Autoimmune and paraneoplastic workup is recommended.  We will check with the lab if there is enough CSF fluid for these evaluations and will order [facility] paraneoplastic/autoimmune panel tomorrow.  Meanwhile will obtain a CT of the chest abdomen and pelvis to rule out an underlying malignancy. Check echocardiogram.  Check HIV/hepatitis panel.  Further recommendations per neurology/psychiatry team.    #2 leukocytosis, this appears to be related to the use of steroids.    #3  Azotemia, this may be related to the use of steroids, continue IV fluid hydration and monitor renal function daily.    #4 hematuria CT abdomen and pelvis is pending    Lili Carvajal M.D.

## 2018-07-11 NOTE — DISCHARGE PLANNING
Transfer Center:    Received call from Dr. Carvajal requesting to transfer for higher level of care specifically for Rheumatology/Endocrinology/Neurology consults.  Will need to find facilities that accepts patient's insurance:  Medicaid HPN.     Call placed to Medicaid HPN Gerda LAGUNA @ [phone].  She reported that her medical director is well informed of her case but will not reach him until am.  She stated based on previous transfers most likely refer to [facility], [facility],  [facility] and [facility].  Call back information provided.  She will call TC in am after she discuss case with her medical director.

## 2018-07-11 NOTE — PROGRESS NOTES
Neurology progress note  Note Author: Damien Denton M.D.     Name Vilma Foster     1965   Age/Sex 53 y.o. female   MRN [id number]   Code Status Full code     After 5PM or if no immediate response to page, please call for cross-coverage  Attending/Team: Dr. Bernardino ALEXANDRA See Patient List for primary contact information  Call [phone] to page              Reason for interval visit  (Principal Problem)   <principal problem not specified>     Patient initially admitted for acute psychosis, aggressive behavior, and L unilateral hearing loss    Interval Problem Daily Status Update  (24 hours)   No acute event overnight    ROS  Reviewed and negative    Quality Measures  Quality-Core Measures  Babcock catheter::  No Babcock  DVT prophylaxis - mechanical:  SCDs  Antibiotics:  Treating active infection/contamination beyond 24 hours perioperative coverage      Physical Exam       Vitals:    18 1630 18 2000 07/10/18 0400 07/10/18 0730   BP: 148/60 116/87 150/64 143/67   Pulse: 87 76 (!) 50 (!) 55   Resp: 20 20 16 20   Temp: 37.6 °C (99.6 °F) 36.9 °C (98.5 °F) 36.4 °C (97.5 °F) 37.1 °C (98.8 °F)   SpO2: 96% 95% 97% 97%   Weight:       Height:         Body mass index is 37.69 kg/m².    Oxygen Therapy:  Pulse Oximetry: 97 %, O2 (LPM): 0, O2 Delivery: None (Room Air)    Physical Exam        Head/Neck: NCAT. no meningismus neg kernig neg brudzinski. No obvious mass or heard bruit. No tender arteries or lost pulses. No rash of head or neck.     Skin: Warm, dry, intact. No rashes observed head/neck or body     Eyes/Funduscopic: unable     Mental Status: Awake, alert, oriented to name. Slow follows commands. No neglect/extinction. Attention and concentration, recent & remote memory, Fund of Knowledge compromised.  Does recognize granddaughter pic as alisson and as granddaughter.     Cranial Nerves: CN II-XII intact. PERRL 4mm.   No afferent pupillary defect. EOM full. VF full. No nystagmus.                            Motor: bulk & tone wnl.  spon intact and sym, no abn mvmts                Sensory: symmetric to pain     Coordination: dysmetria absent      DTR's: intact/sym. no clonus. Toes mute.     Gait/Station: n/a fall concern    Lab Data Review:         7/10/2018  6:39 PM    Recent Labs      07/10/18   0859   SODIUM  141   POTASSIUM  4.0   CHLORIDE  107   CO2  24   BUN  33*   CREATININE  0.91   CALCIUM  9.4       Recent Labs      07/10/18   0859   GLUCOSE  133*       Recent Labs      07/10/18   0859   RBC  5.04   HEMOGLOBIN  14.6   HEMATOCRIT  44.6   PLATELETCT  346       Recent Labs      07/10/18   0859   WBC  14.3*   NEUTSPOLYS  88.00*   LYMPHOCYTES  9.10*   MONOCYTES  1.80   EOSINOPHILS  0.00   BASOPHILS  0.10           Assessment/Plan     Encephalopathy    Patient presented with neuropsychiatric features, her TPO is 311.6 which is very high, but anti TG normal.  She is responding to high dose steroid  Most likely Hashimoto thyroiditis    Plan:  - continue on steroid  - need rheumatology/ immunology consult  - need autoimmune and paraneoplastic antibodies panel.    Will sign off the case. If you have any question, do not hesitate to contact us.

## 2018-07-11 NOTE — DISCHARGE PLANNING
Anticipated Discharge Disposition: [facility]    Action: LSW received VM from Dann ([id number]) in the transfer center. Pt has been accepted for transfer to [facility] for higher level of care.     Bedside RN, Sheyla and MD notified.    LSW and Dr. Carvajal met with pt and pt's dtr, Nia louis. Transfer to [facility] was explained. Pt and dtr agreeable to transfer.  COBRA authorization obtained.      LSW met with Dann from transfer center. Information verified for transfer.    LSW received tc from Dann. [employer] flight has been delayed due to weather. New pick-up time is 1800.    Pt, Pt's Dtr, Bedside RN and MD notified.      COBRA placed in pt's chart.    Barriers to Discharge: None    Plan: Pt to transfer to [facility] at 1800

## 2018-07-11 NOTE — DISCHARGE SUMMARY
Discharge Summary    CHIEF COMPLAINT ON ADMISSION  Encephalopathy    Reason for Admission  Acute psychosis     CODE STATUS  Full Code    HPI & HOSPITAL COURSE  This is a 53 y.o. female here with acute encephalopathy.  This has been an acute occurrence.  No prior medical history of significance.  In the end of June 2018 patient started becoming confused and aggressive towards her family.  Intermittent episodes of confusion and staring blankly into space prior to presentation.  Aggression towards her grandchildren that she was occupied with demons.  Telling her family that she had snakes in her head and having visual and auditory hallucinations prior to presentation.  In addition complains of hearing loss prior to presentation.  She presented to [facility] in [facility] emergency room with these complaints on July 1, 2018 and was admitted for further evaluation with an MRI.  Unfortunately an MRI could not be obtained given patient's combative behavior and patient was subsequently transferred on July 2, 2018 to [facility] here in [facility] for further evaluation and management.  Patient was found to have acute psychosis/encephalopathy.  She was evaluated by neurology and psychiatry during her hospitalization.  CT of the head was negative.  MRI of the brain was reported to be normal.  Lumbar puncture for CSF analysis was performed on July 6, 2018, which was within normal parameters.  Detail evaluation of underlying encephalopathy was obtained during this hospital stay.  This was performed under the guidance of neurology team.  Patient was found to have negative blood West Nile virus antibodies.  CSF enterovirus, HSV, VZV and West Nile evaluation was negative.  CSF NMDA receptor antibodies were negative.  No oligoclonal bands were noted in the CSF.  Minimal elevation of CRP and ESR was noted.  CODY was noted to be positive with a titer of 1:80.  Glutamic acid decarboxylase antibody  was negative.  Vitamin B1 levels were 72, within normal parameters.  B12 levels were normal.  Folate levels were normal.  B6 levels were noted to be 20.4, within normal parameters.  Patient was noted to have elevated thyroid peroxidase antibody with a level of 311.6 and negative thyroglobulin levels.  Rheumatoid factor was negative.  CCP antibody was negative.  ANCA was negative.  Celiac disease antibody panel was negative.  Patient was noted to have a negative lupus anticoagulant and anticardiolipin antibodies.  Serum RPR was nonreactive.  HIV and acute hepatitis screening was negative.  At this point neurology team has believed that patient likely has Hashimoto's or autoimmune encephalitis.  She was evaluated by psychiatric team during the hospitalization, and she has been on risperidone and valproate here at our center.  Per neurology team recommendations, patient was managed with pulse dose intravenous Solu-Medrol 1000 mg daily for 5 days.  With this she has had minimal improvement in her symptoms.  She has been subsequently maintained on Pepcid and sliding scale insulin therapy.  Her psychosis has improved but patient continues to have persistent encephalopathy.  At this time patient is alert and oriented ×4 but she is unable to be engaged in a productive conversation with persistent encephalopathy.  Given persistent nature of her encephalopathy and nonresponsiveness to steroids, neurology team recommended workup of further autoimmune and paraneoplastic concerns.  To achieve this we have added [facility] paraneoplastic panel to the CSF obtained earlier during this hospitalization, on the day of discharge to [facility] and the results of these remain pending at the time of discharge.  This will need to be followed up by the [facility] medical team.  Also we have added a serum autoimmune [facility] panel and a paraneoplastic panel, which will need to be followed up by the [facility]  "medical team.  In addition a VDRL CSF was also requested and this remains pending.  In addition given concern for paraneoplastic syndrome we obtained a CT of the chest abdomen and pelvis and this revealed \"no concerns for malignancy \".  Gas in the bladder was noted on this study, would recommend obtaining a urinalysis upon transfer to [facility] for ensuring that there is no component of cystitis.  Otherwise an EEG obtained during the hospital stay did not reveal any concerns for seizures.  Further we had planned an echocardiogram to be obtained, this could not be facilitated given patient is transferring to [facility].  At this time our neurology team has recommended further consultation from rheumatology and endocrinology to see if this patient will benefit from plasmapheresis or administration of IVIG.  Unfortunately our rheumatology and endocrinology teams were unable to help with this and given this patient is being transferred to [facility] for further evaluation and management.  It would be our recommendation that at [facility] hospital medicine, infectious disease, neurology, rheumatology, endocrinology and hematology evaluate this patient and collectively reached a conclusive diagnosis to establish a management plan for this patient.  [facility] uses [employer] for electronic medical record system.  Results should be available on [employer] everywhere.  If not, please establish contact with our medical records office which will be coordinating any requests regarding transfer of medical records.  Please feel free to notify [facility] team for ongoing questions or concerns and we can be reached on [phone].  We are thankful to [facility], for excepting the care of this patient at this point in time.  Please do not hesitate to notify us of any future questions or concerns.  Case has been discussed with Dr. Devries from internal medicine at " [facility] who has graciously accepted the care of this patient at this point in time.       Therefore, she is discharged in good and stable condition to a critical access hospital.    The patient met 2-midnight criteria for an inpatient stay at the time of discharge.      DISCHARGE DIAGNOSES  Active Problems:    Acute encephalopathy POA: Yes    Hearing loss of left ear POA: Yes    Hyperlipidemia POA: Yes    Hypothyroidism POA: Yes  Resolved Problems:    * No resolved hospital problems. *      FOLLOW UP  No future appointments.  No follow-up provider specified.    MEDICATIONS ON DISCHARGE     Medication List      START taking these medications      Instructions   acetaminophen 325 MG Tabs  Commonly known as:  TYLENOL   Take 2 Tabs by mouth every 6 hours as needed (Mild Pain; (Pain scale 1-3); Temp greater than 100.5 F).  Dose:  650 mg     D5W 5 % SOLN 50 mL with valproate 100 MG/ML SOLN 500 mg   500 mg by Intravenous route 2 times a day.  Dose:  500 mg     famotidine 20 MG Tabs  Commonly known as:  PEPCID   Take 1 Tab by mouth 2 Times a Day.  Dose:  20 mg     insulin regular 100 Unit/mL Soln  Commonly known as:  HUMULIN R   Inject 1-6 Units as instructed 4 Times a Day,Before Meals and at Bedtime.  Dose:  1-6 Units     LORazepam 2 MG/ML Soln  Commonly known as:  ATIVAN   0.5 mL by Intravenous route every four hours as needed for up to 3 days.  Dose:  1 mg     polyethylene glycol/lytes Pack  Commonly known as:  MIRALAX   Take 1 Packet by mouth 2 Times a Day.  Dose:  17 g     risperiDONE 1 MG Tabs  Commonly known as:  RISPERDAL   Take 1 Tab by mouth every evening.  Dose:  1 mg            Allergies  No Known Allergies    DIET  Orders Placed This Encounter   Procedures   • Diet Order Regular (Plastic utensils only)     Standing Status:   Standing     Number of Occurrences:   1     Order Specific Question:   Diet:     Answer:   Regular [1]     Comments:   Plastic utensils only       ACTIVITY  As tolerated.  Weight  bearing as tolerated    LINES, DRAINS, AND WOUNDS  This is an automated list. Peripheral IVs will be removed prior to discharge.  PIV Group Left Hand 20g Flexible Catheter (Active)   Line Secured Taped;Transparent 7/11/2018  8:00 AM   Site Condition / Description Patent;Assessed;Clean;Dry;Intact 7/11/2018  8:00 AM   Dressing Type / Description Transparent;Clean;Dry;Intact 7/11/2018  8:00 AM   Dressing Status Initial Dressing 7/11/2018  8:00 AM   Saline Locked Yes 7/10/2018 10:39 PM   Infiltration Grading Used by [facility] [place] 7/11/2018  8:00 AM   Phlebitis Scale (Used by [facility]) 0 7/11/2018  8:00 AM                     MENTAL STATUS ON TRANSFER  Level of Consciousness: Confused  Orientation : Disoriented to Event  Speech: Speech Clear    CONSULTATIONS  Neurology and psychiatry    PROCEDURES  Lumbar puncture, EEG    LABORATORY  Lab Results   Component Value Date    SODIUM 138 07/11/2018    POTASSIUM 4.2 07/11/2018    CHLORIDE 106 07/11/2018    CO2 23 07/11/2018    GLUCOSE 119 (H) 07/11/2018    BUN 30 (H) 07/11/2018    CREATININE 0.83 07/11/2018        Lab Results   Component Value Date    WBC 13.2 (H) 07/11/2018    HEMOGLOBIN 12.8 07/11/2018    HEMATOCRIT 39.0 07/11/2018    PLATELETCT 296 07/11/2018        Total time of the discharge process exceeds 60 minutes.

## 2018-07-11 NOTE — PROGRESS NOTES
[facility] Hospitalist Progress Note    Date of Service: 7/10/2018    Chief Complaint  53 y.o. female transferred from [facility]  7/3/2018 with altered MS,  psychosis, aggressive behavior to family, and left hearing loss.     Interval Problem Update  -Patient mentation slightly improved with steroids, however continued flat/slowed affect with responses inappropriate @ times   -?Autoimmune Encephalitis vs Hashimoto's encephalitis - spoke with endocrine and rheumatology with no further recommendations   -Pending further paraneoplastic/autoimmune panels for further plan     Consultants/Specialty  Psychiatry   Neurology.     Disposition  TBD        Review of Systems   Unable to perform ROS: Medical condition (Inappropiate responses )   Constitutional:        Awake, flat affect      Physical Exam  Laboratory/Imaging   Hemodynamics  Temp (24hrs), Av.8 °C (98.3 °F), Min:36.4 °C (97.5 °F), Max:37.1 °C (98.8 °F)   Temperature: 36.9 °C (98.5 °F)  Pulse  Av  Min: 50  Max: 105    Blood Pressure: 152/69      Respiratory      Respiration: 18, Pulse Oximetry: 97 %             Fluids    Intake/Output Summary (Last 24 hours) at 07/10/18 2115  Last data filed at 07/10/18 0800   Gross per 24 hour   Intake              290 ml   Output              800 ml   Net             -510 ml       Nutrition  Orders Placed This Encounter   Procedures   • Diet Order Regular (Plastic utensils only)     Standing Status:   Standing     Number of Occurrences:   1     Order Specific Question:   Diet:     Answer:   Regular [1]     Comments:   Plastic utensils only     Physical Exam   Constitutional: She is oriented to person, place, and time. She appears well-developed. No distress.   Awake and alert, but affect flat with inappropriate/inconsistant responses to questions @ times    HENT:   Head: Normocephalic and atraumatic.   Left ear with slight hearing loss unchanged from previous   Eyes: Right eye exhibits no discharge. Left eye  exhibits no discharge. No scleral icterus.   Neck: Normal range of motion. Neck supple. No JVD present. No thyromegaly present.   Cardiovascular: Normal rate, regular rhythm, normal heart sounds and intact distal pulses.    No murmur heard.  Pulmonary/Chest: Effort normal and breath sounds normal. No stridor. No respiratory distress. She has no wheezes. She has no rales.   Abdominal: Soft. Bowel sounds are normal. She exhibits no distension. There is no tenderness.   Obese.    Musculoskeletal: Normal range of motion. She exhibits no edema or tenderness.   Generalized weakness   Neurological: She is alert and oriented to person, place, and time. She has normal strength. No sensory deficit.   Intermittently A&O X 3 and inconsistent     Skin: Skin is warm, dry and intact. She is not diaphoretic. No pallor.   Psychiatric: Her affect is inappropriate. Her speech is delayed. She is slowed. She expresses inappropriate judgment.   Vitals reviewed.      Recent Labs      07/10/18   0859   WBC  14.3*   RBC  5.04   HEMOGLOBIN  14.6   HEMATOCRIT  44.6   MCV  88.5   MCH  29.0   MCHC  32.7*   RDW  42.5   PLATELETCT  346   MPV  10.8     Recent Labs      07/10/18   0859   SODIUM  141   POTASSIUM  4.0   CHLORIDE  107   CO2  24   GLUCOSE  133*   BUN  33*   CREATININE  0.91   CALCIUM  9.4                      Assessment/Plan     Acute encephalopathy- (present on admission)   Assessment & Plan    Slightly improved   Concern for Encephalitis--elevated ESR , anti TPO Atbs suggest Hashimotos Encephalitis or autoimmune cause.   Spoke with endocrine and rheumatology with no further recommendations and to refer to neurology  F/U on paraneoplastic and autoimmune panels   Pysch consulted ?psych component vs. Medical    Continue Risperdal and Depakote for now  Legal hold d/c   Continue steroids   Consider transfer to [place] if no improvement in current treatment plan                          Hypothyroidism- (present on admission)   Assessment  & Plan    TSH increased and T4 WNL suggesting subclinical hypothyroidism              Hyperlipidemia- (present on admission)   Assessment & Plan    No acute need for medications and does not take medications @ home    F/U outpatient        Hearing loss of left ear- (present on admission)   Assessment & Plan    Hearing loss slightly improved with MRI revealing no acute findings.           Quality-Core Measures   Reviewed items::  Medications reviewed, Labs reviewed, Radiology images reviewed and EKG reviewed  Babcock catheter::  No Babcock  DVT prophylaxis - mechanical:  SCDs  Ulcer Prophylaxis::  Yes      MICHELLE Tomlin

## 2018-07-11 NOTE — THERAPY
"Occupational Therapy Evaluation completed.   Functional Status:    Sup><sit: SBA  Sit><stand: CGA  Amb to bathroom without AD, required HHA, pt a fall risk  Toilet xfer: CGA  Doff/don briefs prior to toileting: CGA  1 significant LOB requiring Mod A to recover    Plan of Care: Will benefit from Occupational Therapy 3 times per week  Discharge Recommendations:  Equipment: Will Continue to Assess for Equipment Needs. Post-acute therapy Discharge to a transitional care facility for continued skilled therapy services.    See \"Rehab Therapy-Acute\" Patient Summary Report for complete documentation.    Pt is a 54 y/o female admitted to hospital with ALOC, psychosis, and aggressive behavior towards family. Pt has apparently improved since being in the hospital - is intermittently confused, but fully verbal. Pt cooperative with therapy, demonstrating functional transfers and toileting. One LOB occurred during session after standing up from the toilet, mod A to correct. Pt's dtr at bedside reporting that she has 4 children to take care of at home and does not feel as though she will be able to help care for her mother. Post acute therapy would be appropriate for the pt at this time. Acute OT will continue to follow.   "

## 2018-07-11 NOTE — PROGRESS NOTES
Pt has made improvements from the last two days I have spent with her. She is now A&O x 3 but cannot recall why she is in the hospital. She is now able to feed herself without any assistance. Daughter was at bedside for a few hours today and was very pleased with her mother's improvements. The patient transferred to the bedside commode to urinate and have a bowel movement, then the CNA and myself proceeded to give her a full bedbath and changed all of her linens. Pt still gets tearful at times.  VSS, and no c/o pain throughout the day.

## 2018-07-12 NOTE — PROGRESS NOTES
Patient discharged to [facility] via [facility] and then flight team (all at bedside and all given report). Patient A&ox2-3. No complaints of pain or discomfort. Tele off and IV kept in. Daughter Nia was at the bedside earlier and is aware of discharge to [facility]. Called report to Adeline JIANG at [phone]. Dr. Carvajal aware of recent CT abd/pelvis results. Discharge instructions gone over with patient and daughter. Safety precautions maintained. Patient discharged at this time.

## 2018-07-12 NOTE — DISCHARGE INSTRUCTIONS
Discharge Instructions    Discharged to other by medical transportation with escort. Discharged via wheelchair, hospital escort: Yes.  Special equipment needed: Not Applicable    Be sure to schedule a follow-up appointment with your primary care doctor or any specialists as instructed.     Discharge Plan:   Diet Plan: Discussed  Activity Level: Discussed  Confirmed Follow up Appointment: Patient to Call and Schedule Appointment  Confirmed Symptoms Management: Discussed  Medication Reconciliation Updated: Yes  Influenza Vaccine Indication: Not indicated: Previously immunized this influenza season and > 8 years of age    I understand that a diet low in cholesterol, fat, and sodium is recommended for good health. Unless I have been given specific instructions below for another diet, I accept this instruction as my diet prescription.   Other diet: Regular Diet. Thin liquids. (Crush meds in pudding)    Special Instructions: None    · Is patient discharged on Warfarin / Coumadin?   No     Depression / Suicide Risk    As you are discharged from this [facility] facility, it is important to learn how to keep safe from harming yourself.    Recognize the warning signs:  · Abrupt changes in personality, positive or negative- including increase in energy   · Giving away possessions  · Change in eating patterns- significant weight changes-  positive or negative  · Change in sleeping patterns- unable to sleep or sleeping all the time   · Unwillingness or inability to communicate  · Depression  · Unusual sadness, discouragement and loneliness  · Talk of wanting to die  · Neglect of personal appearance   · Rebelliousness- reckless behavior  · Withdrawal from people/activities they love  · Confusion- inability to concentrate     If you or a loved one observes any of these behaviors or has concerns about self-harm, here's what you can do:  · Talk about it- your feelings and reasons for harming yourself  · Remove any means that you  might use to hurt yourself (examples: pills, rope, extension cords, firearm)  · Get professional help from the community (Mental Health, Substance Abuse, psychological counseling)  · Do not be alone:Call your Safe Contact- someone whom you trust who will be there for you.  · Call your local CRISIS HOTLINE 809-9576 or 657-632-3995  · Call your local Children's Mobile Crisis Response Team [place] (234) 672-5251 or www.[employer]  · Call the toll free National Suicide Prevention Hotlines   · National Suicide Prevention Lifeline 827-500-ZZXN (5565)  · National Hope Line Network 800-SUICIDE (746-7497)

## 2018-07-13 LAB — VDRL CSF QL: NON REACTIVE

## 2018-07-13 NOTE — PSYCHIATRY
"PSYCHIATRIC FOLLOW UP:    Reason for Admission: [facility]   Legal hold status:   None        HPI:       54 y/o woman with acute encephalopathy. Has been improving slightly on steroids, risperdal, valproate . Today she talks to me with less pauses for anxious breathing. She has not been agitated. She is smiling and has a brighter affect.     Psychiatric Examination: observed phenomenon:  Vitals:Temp (24hrs), Av.8 °C (98.3 °F), Min:36.4 °C (97.5 °F), Max:37.1 °C (98.8 °F)   Temperature: 36.9 °C (98.5 °F)  Pulse  Av  Min: 50  Max: 105    Blood Pressure: 152/69    Musculoskeletal  Psychomotor retardation, improving. She is moving more easily.   Appearance:Grooming wnl   Thoughts:more logical.   Speech:more fluent   Mood:    \"better\"   Affect:    Smiling   SI/HI:   None   Attention/Alertness:   Attention poor   Memory:    Impaired   Orientation:      Fund of Knowledge: adequate at baseline       Cognition: improving but only slightly.   Insight/Judgement into symptoms poor   Neurological Testing:    Medical systems reviewed:   Denies pain   Denies n/v  All other systems reviewed and are negative.       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   Signing off.             "

## 2018-07-20 LAB — TEST NAME 95000: NORMAL

## 2018-07-21 LAB — TEST NAME 95000: NORMAL

## 2018-08-08 ENCOUNTER — OFFICE VISIT (OUTPATIENT)
Dept: INTERNAL MEDICINE | Facility: MEDICAL CENTER | Age: 53
End: 2018-08-08
Payer: MEDICAID

## 2018-08-08 VITALS
OXYGEN SATURATION: 94 % | TEMPERATURE: 98.3 F | DIASTOLIC BLOOD PRESSURE: 82 MMHG | WEIGHT: 179 LBS | HEIGHT: 59 IN | HEART RATE: 85 BPM | BODY MASS INDEX: 36.08 KG/M2 | SYSTOLIC BLOOD PRESSURE: 136 MMHG

## 2018-08-08 DIAGNOSIS — G93.40 ACUTE ENCEPHALOPATHY: ICD-10-CM

## 2018-08-08 DIAGNOSIS — R73.9 HYPERGLYCEMIA: ICD-10-CM

## 2018-08-08 DIAGNOSIS — E66.9 OBESITY (BMI 30-39.9): ICD-10-CM

## 2018-08-08 DIAGNOSIS — E06.3 HYPOTHYROIDISM DUE TO HASHIMOTO'S THYROIDITIS: ICD-10-CM

## 2018-08-08 DIAGNOSIS — E78.5 HYPERLIPIDEMIA, UNSPECIFIED HYPERLIPIDEMIA TYPE: ICD-10-CM

## 2018-08-08 DIAGNOSIS — E03.8 HYPOTHYROIDISM DUE TO HASHIMOTO'S THYROIDITIS: ICD-10-CM

## 2018-08-08 DIAGNOSIS — E66.9 OBESITY (BMI 35.0-39.9 WITHOUT COMORBIDITY): ICD-10-CM

## 2018-08-08 DIAGNOSIS — F23 ACUTE PSYCHOSIS (HCC): ICD-10-CM

## 2018-08-08 PROCEDURE — 99204 OFFICE O/P NEW MOD 45 MIN: CPT | Mod: GC | Performed by: INTERNAL MEDICINE

## 2018-08-08 RX ORDER — SULFAMETHOXAZOLE AND TRIMETHOPRIM 800; 160 MG/1; MG/1
TABLET ORAL
Refills: 0 | COMMUNITY
Start: 2018-07-16 | End: 2018-08-08

## 2018-08-08 RX ORDER — SULFAMETHOXAZOLE AND TRIMETHOPRIM 800; 160 MG/1; MG/1
TABLET ORAL
COMMUNITY
Start: 2018-07-16 | End: 2018-08-08

## 2018-08-08 RX ORDER — LEVOTHYROXINE SODIUM 0.05 MG/1
50 TABLET ORAL
Qty: 35 TAB | Refills: 0 | Status: SHIPPED | OUTPATIENT
Start: 2018-08-08 | End: 2018-09-03 | Stop reason: SDUPTHER

## 2018-08-08 ASSESSMENT — ENCOUNTER SYMPTOMS
FEVER: 0
WEIGHT LOSS: 0
CHILLS: 0
DIARRHEA: 0
CONSTIPATION: 0
WHEEZING: 0
DIZZINESS: 0
MYALGIAS: 0
ABDOMINAL PAIN: 0
BLURRED VISION: 0
HEADACHES: 0
NERVOUS/ANXIOUS: 1
SORE THROAT: 0
PALPITATIONS: 0
MEMORY LOSS: 1
COUGH: 0

## 2018-08-08 ASSESSMENT — PAIN SCALES - GENERAL: PAINLEVEL: NO PAIN

## 2018-08-09 NOTE — PROGRESS NOTES
New Patient to Establish    Reason to establish: New patient to establish    CC: Establish care    HPI: Ms. Foster is a 53-year-old female with a past medical history of acute psychosis vs Hashimoto's encephalopathy and is here to establish care as well as for psychiatric referral.    Psychosis:  Patient had an episode of acute psychosis in the first week of July, around the time she found out that her son-in-law passed away.  She had auditory and visual hallucinations, aggressive behavior to both her grandchildren, and loss of memory.  She was taken to the ED where they did a lumbar puncture for CSF analysis and an MRI, which were normal.  They ruled out viral etiology as well as syphilis, HIV, hepatitis, B12 deficiency.  CODY was positive, titer was 1:80.  Anti-TPO antibodies positive.  [facility] paraneoplastic panel as well as serum autoimmune [facility] panel was ordered and the patient was transferred to [facility] for further workup.  She was sent back to [place] to follow-up with a psychiatrist and a primary care provider.    Thyroid:   Her daughter had thyroid issues and underwent a total thyroidectomy.   Patient's TSH is 7.02, free T4 0.92.  Positive microsomal anti-TPO antibodies.    Encephalopathy:  Her LFTs BUN and creatinine within normal limits    Raised blood glucose levels:  Glycosylated hemoglobin at 6.1    Patient Active Problem List    Diagnosis Date Noted   • Acute encephalopathy 07/02/2018     Priority: High   • Acute psychosis 08/08/2018   • Obesity (BMI 35.0-39.9 without comorbidity) ([facility]) 08/08/2018   • Hearing loss of left ear 07/02/2018   • Headache 07/02/2018   • Hyperlipidemia 07/02/2018   • Hyperglycemia 07/02/2018   • Hypothyroidism 07/02/2018       Past Medical History:   Diagnosis Date   • Head ache    • Hearing loss    • Hypertriglyceridemia    • Thyroid disease        Current Outpatient Prescriptions   Medication Sig Dispense Refill   • Cholecalciferol (VITAMIN D3) 5000 units  "Cap 5,000 Units.     • levothyroxine (SYNTHROID) 50 MCG Tab Take 1 Tab by mouth Every morning on an empty stomach for 35 days. 35 Tab 0   • risperiDONE (RISPERDAL) 1 MG Tab Take 1 Tab by mouth every evening. 60 Tab 3     No current facility-administered medications for this visit.        Allergies as of 08/08/2018   • (No Known Allergies)       Social History     Social History   • Marital status:      Spouse name: N/A   • Number of children: N/A   • Years of education: N/A     Occupational History   • Not on file.     Social History Main Topics   • Smoking status: Never Smoker   • Smokeless tobacco: Never Used   • Alcohol use No   • Drug use: No   • Sexual activity: Not on file     Other Topics Concern   • Not on file     Social History Narrative   • No narrative on file       Family History   Problem Relation Age of Onset   • Lung Disease Mother    • Psychiatry Mother        Past Surgical History:   Procedure Laterality Date   • GYN SURGERY      C- section        Review of Systems   Constitutional: Negative for chills, fever and weight loss.   HENT: Negative for sore throat.    Eyes: Negative for blurred vision.   Respiratory: Negative for cough and wheezing.    Cardiovascular: Negative for chest pain and palpitations.   Gastrointestinal: Negative for abdominal pain, constipation and diarrhea.   Genitourinary: Negative for dysuria.   Musculoskeletal: Negative for joint pain and myalgias.   Skin: Negative for rash.        Reports having 'bumps' on the medial side of her left leg. She had a DVT work up for that which was negative.    Neurological: Negative for dizziness and headaches.   Psychiatric/Behavioral: Positive for memory loss. The patient is nervous/anxious.          /82   Pulse 85   Temp 36.8 °C (98.3 °F)   Ht 1.494 m (4' 10.8\")   Wt 81.2 kg (179 lb)   LMP 07/02/2011 (Approximate)   SpO2 94%   Breastfeeding? No   BMI 36.40 kg/m²     Physical Exam   Constitutional: She is oriented to " person, place, and time. Vital signs are normal.   Anxious    HENT:   Head: Atraumatic.   Mouth/Throat: Oropharynx is clear and moist.   Eyes: Conjunctivae are normal.   Neck: No thyromegaly present.   Cardiovascular: Normal rate, regular rhythm and normal heart sounds.    Pulmonary/Chest: Breath sounds normal.   Abdominal: Soft. Bowel sounds are normal. She exhibits no distension.   Musculoskeletal: She exhibits edema. She exhibits no deformity.   Non pitting edema of both lower limbs.    Lymphadenopathy:     She has no cervical adenopathy.   Neurological: She is alert and oriented to person, place, and time. No cranial nerve deficit.   Skin: No rash noted.       Note: I have reviewed all pertinent labs and diagnostic tests associated with this visit with specific comments listed under the assessment and plan below    Assessment and Plan    1. Acute encephalopathy vs Acute Psychosis:   - Patient has a history of Acute encephalopathy a month ago  -LFTs BUN and creatinine were within normal limits  -Patient is currently alert &  Oriented  -Placed a referral to neurology to rule out medical causes of psychosis  -Placed referral to psychiatry for follow-up of psychosis.     2. Hypothyroidism due to Hashimoto's thyroiditis  -TSH at 7.02, positive for microsomal anti-TPO antibodies; likely Hashimoto's thyroiditis.  -Maybe 1 of the factors contributing to psychosis.  -Started her on 50 mcg of levothyroxine daily.     3. Obesity (BMI 35.0-39.9 without comorbidity)  -Discussed with the patient's the risk of obesity and encouraged the patient patient to make lifestyle changes diet changes and exercise.      4. Hyperglycemia and Hyperlipidemia:  -Glycosylated hemoglobin at 6  -Elevated total cholesterol and LDL  -Encouraged patient to make dietary changes and to exercise.  -Will follow her blood sugar levels and lipid levels in the next visit and consider medical management.    5. Preventive care:  She has not seen PCP in 28  years.  Discussed immunizations and age-appropriate screening with the patient.  She would like to address them in the follow-up visit in 5 weeks      Followup: Return in about 5 weeks (around 9/12/2018).    Signed by: Michaela Infante M.D.

## 2018-09-04 NOTE — TELEPHONE ENCOUNTER
Last seen: 08/08/18 by Dr. Infante  Next appt: 09/11/18 with Dr. Infante    Was the patient seen in the last year in this department? Yes   Does patient have an active prescription for medications requested? No   Received Request Via: Pharmacy

## 2018-09-07 RX ORDER — LEVOTHYROXINE SODIUM 0.05 MG/1
50 TABLET ORAL
Qty: 90 TAB | Refills: 0 | Status: SHIPPED | OUTPATIENT
Start: 2018-09-07 | End: 2018-09-11 | Stop reason: SDUPTHER

## 2018-09-11 ENCOUNTER — OFFICE VISIT (OUTPATIENT)
Dept: INTERNAL MEDICINE | Facility: MEDICAL CENTER | Age: 53
End: 2018-09-11
Payer: MEDICAID

## 2018-09-11 VITALS
HEIGHT: 58 IN | DIASTOLIC BLOOD PRESSURE: 76 MMHG | BODY MASS INDEX: 37.57 KG/M2 | OXYGEN SATURATION: 99 % | WEIGHT: 179 LBS | SYSTOLIC BLOOD PRESSURE: 148 MMHG | HEART RATE: 54 BPM | TEMPERATURE: 97.4 F

## 2018-09-11 DIAGNOSIS — Z12.39 SCREENING BREAST EXAMINATION: ICD-10-CM

## 2018-09-11 DIAGNOSIS — E06.3 HYPOTHYROIDISM DUE TO HASHIMOTO'S THYROIDITIS: ICD-10-CM

## 2018-09-11 DIAGNOSIS — Z23 NEED FOR INFLUENZA VACCINATION: ICD-10-CM

## 2018-09-11 DIAGNOSIS — Z01.419 PAP SMEAR, LOW-RISK: ICD-10-CM

## 2018-09-11 DIAGNOSIS — Z12.4 ROUTINE CERVICAL SMEAR: ICD-10-CM

## 2018-09-11 DIAGNOSIS — Z13.1 DIABETES MELLITUS SCREENING: ICD-10-CM

## 2018-09-11 DIAGNOSIS — E03.8 HYPOTHYROIDISM DUE TO HASHIMOTO'S THYROIDITIS: ICD-10-CM

## 2018-09-11 PROCEDURE — 99214 OFFICE O/P EST MOD 30 MIN: CPT | Mod: GC,25 | Performed by: INTERNAL MEDICINE

## 2018-09-11 PROCEDURE — 90686 IIV4 VACC NO PRSV 0.5 ML IM: CPT | Performed by: INTERNAL MEDICINE

## 2018-09-11 PROCEDURE — 90471 IMMUNIZATION ADMIN: CPT | Performed by: INTERNAL MEDICINE

## 2018-09-11 RX ORDER — LEVOTHYROXINE SODIUM 0.05 MG/1
50 TABLET ORAL
Qty: 10 TAB | Refills: 0 | Status: SHIPPED | OUTPATIENT
Start: 2018-09-11 | End: 2018-09-21

## 2018-09-11 ASSESSMENT — ENCOUNTER SYMPTOMS
BLURRED VISION: 0
SHORTNESS OF BREATH: 0
FEVER: 0
SINUS PAIN: 0
HALLUCINATIONS: 0
DIARRHEA: 0
MYALGIAS: 0
DIZZINESS: 0
HEADACHES: 0
ORTHOPNEA: 0
PALPITATIONS: 0
CHILLS: 0
WEIGHT LOSS: 0
COUGH: 0
CONSTIPATION: 0

## 2018-09-11 ASSESSMENT — LIFESTYLE VARIABLES: SUBSTANCE_ABUSE: 0

## 2018-09-11 NOTE — PROGRESS NOTES
Established Patient    Vilma presents today with the following:    CC: Medication refill preventive screening examination.    HPI: Vilma Foster is a 53-year-old female with a past medical history of Hashimoto's hypothyroidism and brief psychotic disorder.     Hypothyroidism: Patient is on 50 mcg of levothyroxine daily.  Patient reports feeling much better ever since she started thyroid medication.  Patient's mood seems much better and she seems happier than she was when she was here last time. Does not report any weakness, palpitations, weight loss or weight gain, diarrhea or constipation. She is here for refilling her thyroid pills.    Brief psychotic disorder: She was referred to neurology and psychiatry during her last visit.  Patient has her psychiatrist appointment on October 3 and the neurologist appointment in January next year.  She was started on risperidone she had a psychotic episode in July. Patient reports discontinuing risperidone after talking with her psychiatrist as she feels better and less anxious being off of the medication.     Preventive screening examination: In her last visit patient was educated about the need for screening tests and preventive vaccination.  Pap smear was performed under Dr. Luther's  Supervision.  Patient is uncomfortable getting a colonoscopy and says she would think about it and address that in her next visit.      Patient Active Problem List    Diagnosis Date Noted   • Acute encephalopathy 07/02/2018     Priority: High   • Acute psychosis 08/08/2018   • Obesity (BMI 35.0-39.9 without comorbidity) ([facility]) 08/08/2018   • Obesity (BMI 30-39.9) 08/08/2018   • Hearing loss of left ear 07/02/2018   • Headache 07/02/2018   • Hyperlipidemia 07/02/2018   • Hyperglycemia 07/02/2018   • Hypothyroidism 07/02/2018       Current Outpatient Prescriptions   Medication Sig Dispense Refill   • levothyroxine (SYNTHROID) 50 MCG Tab Take 1 Tab by mouth Every morning on an empty  "stomach for 10 days. 10 Tab 0   • Cholecalciferol (VITAMIN D3) 5000 units Cap 5,000 Units.     • risperiDONE (RISPERDAL) 1 MG Tab Take 1 Tab by mouth every evening. 60 Tab 3     No current facility-administered medications for this visit.        ROS: As per HPI. Additional pertinent symptoms as noted below.  Review of Systems   Constitutional: Negative for chills, fever and weight loss.   HENT: Negative for sinus pain.    Eyes: Negative for blurred vision.   Respiratory: Negative for cough and shortness of breath.    Cardiovascular: Negative for chest pain, palpitations, orthopnea and leg swelling.   Gastrointestinal: Negative for constipation and diarrhea.   Genitourinary: Negative for dysuria.   Musculoskeletal: Negative for myalgias.   Skin: Negative for rash.   Neurological: Negative for dizziness and headaches.   Psychiatric/Behavioral: Negative for hallucinations and substance abuse.         /76   Pulse (!) 54   Temp 36.3 °C (97.4 °F)   Ht 1.473 m (4' 10\")   Wt 81.2 kg (179 lb)   LMP 07/02/2011 (Approximate)   SpO2 99%   BMI 37.41 kg/m²     Physical Exam   Constitutional: She is oriented to person, place, and time. No distress.   HENT:   Mouth/Throat: Oropharynx is clear and moist.   Eyes: Conjunctivae are normal.   Neck: No thyromegaly present.   Cardiovascular: Regular rhythm and normal heart sounds.  Bradycardia present.    No murmur heard.  Pulmonary/Chest: Breath sounds normal. She has no wheezes.   Abdominal: Soft. Bowel sounds are normal. She exhibits no distension.   Musculoskeletal: She exhibits no edema.   Lymphadenopathy:     She has no cervical adenopathy.   Neurological: She is alert and oriented to person, place, and time.   Skin: No rash noted.   Psychiatric: Mood and affect normal.       Note: I have reviewed all pertinent labs and diagnostic tests associated with this visit with specific comments listed under the assessment and plan below    Assessment and Plan    1. " Hypothyroidism due to Hashimoto's thyroiditis  -Patient is on 50 mcg of levothyroxine.  - Patient reports feeling better since she started taking thyroid medication, does not report any weight changes or symptoms of hyperthyroidism.  Patient was bradycardic at 59 in the clinic.  -Will check her TSH levels and adjust the dose accordingly, refilled her medications for 10 days until the test results are back.    2. Diabetes mellitus screening:  -HbA1c was elevated at 6.0 in July 2018.  -Ordered fasting CMP to screen for diabetes.    3. Screening and Preventive care  -Patient received flu vaccine as well as a Pap smear during this office visit.  -Ordered screening mammogram.    Follow up:  Will address screening colonoscopy/FIT and need for shingles vaccine.      Followup: Return in about 3 months (around 12/11/2018).      Signed by: Michaela Infante M.D.

## 2018-09-17 DIAGNOSIS — Z12.4 ROUTINE CERVICAL SMEAR: ICD-10-CM

## 2018-09-17 DIAGNOSIS — Z01.419 PAP SMEAR, LOW-RISK: ICD-10-CM

## 2018-09-17 PROBLEM — R87.610 ASCUS OF CERVIX WITH NEGATIVE HIGH RISK HPV: Status: ACTIVE | Noted: 2018-09-17

## 2018-09-17 NOTE — PROGRESS NOTES
Some abnormal cells were seen- not cancer. Two options- repeat PAP with HPV in one year- or ref to gyn for opinion.

## 2018-09-18 DIAGNOSIS — E03.8 HYPOTHYROIDISM DUE TO HASHIMOTO'S THYROIDITIS: ICD-10-CM

## 2018-09-18 DIAGNOSIS — E06.3 HYPOTHYROIDISM DUE TO HASHIMOTO'S THYROIDITIS: ICD-10-CM

## 2018-09-18 DIAGNOSIS — Z13.1 DIABETES MELLITUS SCREENING: ICD-10-CM

## 2018-11-27 ENCOUNTER — HOSPITAL ENCOUNTER (EMERGENCY)
Facility: MEDICAL CENTER | Age: 53
End: 2018-11-29
Attending: EMERGENCY MEDICINE
Payer: MEDICAID

## 2018-11-27 DIAGNOSIS — F41.9 ANXIETY: ICD-10-CM

## 2018-11-27 DIAGNOSIS — R45.851 SUICIDAL IDEATION: ICD-10-CM

## 2018-11-27 DIAGNOSIS — R44.3 HALLUCINATIONS: ICD-10-CM

## 2018-11-27 PROCEDURE — 99285 EMERGENCY DEPT VISIT HI MDM: CPT

## 2018-11-28 ENCOUNTER — APPOINTMENT (OUTPATIENT)
Dept: RADIOLOGY | Facility: MEDICAL CENTER | Age: 53
End: 2018-11-28
Attending: EMERGENCY MEDICINE
Payer: MEDICAID

## 2018-11-28 LAB
ALBUMIN SERPL BCP-MCNC: 4.1 G/DL (ref 3.2–4.9)
ALBUMIN/GLOB SERPL: 1.5 G/DL
ALP SERPL-CCNC: 88 U/L (ref 30–99)
ALT SERPL-CCNC: 18 U/L (ref 2–50)
AMPHETAMINES UR QL: NEGATIVE
ANION GAP SERPL CALC-SCNC: 8 MMOL/L (ref 0–11.9)
APAP SERPL-MCNC: <10 UG/ML (ref 10–30)
APPEARANCE UR: CLEAR
AST SERPL-CCNC: 20 U/L (ref 12–45)
BACTERIA #/AREA URNS HPF: ABNORMAL /HPF
BARBITURATES UR QL SCN: NEGATIVE
BASOPHILS # BLD AUTO: 0.4 % (ref 0–1.8)
BASOPHILS # BLD: 0.04 K/UL (ref 0–0.12)
BENZODIAZ UR QL SCN: NEGATIVE
BILIRUB SERPL-MCNC: 0.7 MG/DL (ref 0.1–1.5)
BILIRUB UR QL STRIP.AUTO: NEGATIVE
BLOOD CULTURE HOLD CXBCH: NORMAL
BLOOD CULTURE HOLD CXBCH: NORMAL
BUN SERPL-MCNC: 15 MG/DL (ref 8–22)
BZE UR QL SCN: NEGATIVE
CALCIUM SERPL-MCNC: 9.2 MG/DL (ref 8.4–10.2)
CHLORIDE SERPL-SCNC: 107 MMOL/L (ref 96–112)
CO2 SERPL-SCNC: 24 MMOL/L (ref 20–33)
COLOR UR: YELLOW
CREAT SERPL-MCNC: 0.92 MG/DL (ref 0.5–1.4)
EOSINOPHIL # BLD AUTO: 0.04 K/UL (ref 0–0.51)
EOSINOPHIL NFR BLD: 0.4 % (ref 0–6.9)
EPI CELLS #/AREA URNS HPF: ABNORMAL /HPF
ERYTHROCYTE [DISTWIDTH] IN BLOOD BY AUTOMATED COUNT: 38.7 FL (ref 35.9–50)
ETHANOL BLD-MCNC: 0 G/DL
GLOBULIN SER CALC-MCNC: 2.8 G/DL (ref 1.9–3.5)
GLUCOSE SERPL-MCNC: 122 MG/DL (ref 65–99)
GLUCOSE UR STRIP.AUTO-MCNC: NEGATIVE MG/DL
HCT VFR BLD AUTO: 42 % (ref 37–47)
HGB BLD-MCNC: 14.1 G/DL (ref 12–16)
IMM GRANULOCYTES # BLD AUTO: 0.03 K/UL (ref 0–0.11)
IMM GRANULOCYTES NFR BLD AUTO: 0.3 % (ref 0–0.9)
KETONES UR STRIP.AUTO-MCNC: NEGATIVE MG/DL
LEUKOCYTE ESTERASE UR QL STRIP.AUTO: NEGATIVE
LYMPHOCYTES # BLD AUTO: 2.54 K/UL (ref 1–4.8)
LYMPHOCYTES NFR BLD: 24.8 % (ref 22–41)
MCH RBC QN AUTO: 28.5 PG (ref 27–33)
MCHC RBC AUTO-ENTMCNC: 33.6 G/DL (ref 33.6–35)
MCV RBC AUTO: 85 FL (ref 81.4–97.8)
MICRO URNS: ABNORMAL
MONOCYTES # BLD AUTO: 0.52 K/UL (ref 0–0.85)
MONOCYTES NFR BLD AUTO: 5.1 % (ref 0–13.4)
MUCOUS THREADS #/AREA URNS HPF: ABNORMAL /HPF
NEUTROPHILS # BLD AUTO: 7.06 K/UL (ref 2–7.15)
NEUTROPHILS NFR BLD: 69 % (ref 44–72)
NITRITE UR QL STRIP.AUTO: NEGATIVE
NRBC # BLD AUTO: 0 K/UL
NRBC BLD-RTO: 0 /100 WBC
PCP UR QL SCN: NEGATIVE
PH UR STRIP.AUTO: 6 [PH]
PLATELET # BLD AUTO: 300 K/UL (ref 164–446)
PMV BLD AUTO: 10 FL (ref 9–12.9)
POTASSIUM SERPL-SCNC: 4.9 MMOL/L (ref 3.6–5.5)
PROT SERPL-MCNC: 6.9 G/DL (ref 6–8.2)
PROT UR QL STRIP: 100 MG/DL
RBC # BLD AUTO: 4.94 M/UL (ref 4.2–5.4)
RBC # URNS HPF: ABNORMAL /HPF
RBC UR QL AUTO: ABNORMAL
SALICYLATES SERPL-MCNC: <4 MG/DL (ref 15–25)
SODIUM SERPL-SCNC: 139 MMOL/L (ref 135–145)
SP GR UR REFRACTOMETRY: 1.02
TSH SERPL DL<=0.005 MIU/L-ACNC: 5.32 UIU/ML (ref 0.38–5.33)
UR OPIATES 2659: NEGATIVE
UR THC 2511T: NEGATIVE
UR TRICYCLIC 2660: NEGATIVE
WBC # BLD AUTO: 10.2 K/UL (ref 4.8–10.8)
WBC #/AREA URNS HPF: ABNORMAL /HPF

## 2018-11-28 PROCEDURE — 70450 CT HEAD/BRAIN W/O DYE: CPT

## 2018-11-28 PROCEDURE — 80307 DRUG TEST PRSMV CHEM ANLYZR: CPT

## 2018-11-28 PROCEDURE — 85025 COMPLETE CBC W/AUTO DIFF WBC: CPT

## 2018-11-28 PROCEDURE — 80053 COMPREHEN METABOLIC PANEL: CPT

## 2018-11-28 PROCEDURE — 84443 ASSAY THYROID STIM HORMONE: CPT

## 2018-11-28 PROCEDURE — 36415 COLL VENOUS BLD VENIPUNCTURE: CPT

## 2018-11-28 PROCEDURE — 80305 DRUG TEST PRSMV DIR OPT OBS: CPT

## 2018-11-28 PROCEDURE — 700102 HCHG RX REV CODE 250 W/ 637 OVERRIDE(OP): Performed by: EMERGENCY MEDICINE

## 2018-11-28 PROCEDURE — A9270 NON-COVERED ITEM OR SERVICE: HCPCS | Performed by: EMERGENCY MEDICINE

## 2018-11-28 PROCEDURE — 81001 URINALYSIS AUTO W/SCOPE: CPT | Mod: XU

## 2018-11-28 RX ORDER — LEVOTHYROXINE SODIUM 0.05 MG/1
50 TABLET ORAL
Status: SHIPPED | COMMUNITY
End: 2019-01-29

## 2018-11-28 RX ORDER — LEVOTHYROXINE SODIUM 0.05 MG/1
50 TABLET ORAL
Status: DISCONTINUED | OUTPATIENT
Start: 2018-11-28 | End: 2018-11-29 | Stop reason: HOSPADM

## 2018-11-28 RX ORDER — LORAZEPAM 1 MG/1
1 TABLET ORAL ONCE
Status: DISPENSED | OUTPATIENT
Start: 2018-11-28 | End: 2018-11-29

## 2018-11-28 RX ADMIN — LEVOTHYROXINE SODIUM 50 MCG: 50 TABLET ORAL at 06:11

## 2018-11-28 ASSESSMENT — PAIN SCALES - GENERAL: PAINLEVEL_OUTOF10: 0

## 2018-11-28 NOTE — DISCHARGE PLANNING
SHAZIA received a call from ROSALIND Álvarez informing this SW that they have secured a bed at [facility] for this patient.  SHAZIA called [facility] and was informed that they still have not received a referral packet.  SHAZIA requested that [facility] resend packet.

## 2018-11-28 NOTE — DISCHARGE PLANNING
SHAZIA called [facility] and was able to confirm that this patient's packet has been received and they are placing her on the board.

## 2018-11-28 NOTE — ED TRIAGE NOTES
"Chief Complaint   Patient presents with   • Hallucinations     for 1 week. HX of same, was admitted for same SX in May or June of this year.   • Suicidal Ideation     states she has had thaoughts of hurting herself, no organized plan     BP (!) 173/93   Pulse (!) 107   Temp 36.1 °C (97 °F) (Temporal)   Resp 18   Ht 1.499 m (4' 11\")   Wt 81.8 kg (180 lb 5.4 oz)   LMP 07/02/2011 (Approximate)   SpO2 97%   BMI 36.42 kg/m²     "
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
## 2018-11-28 NOTE — DISCHARGE PLANNING
Agency/Facility Name: [facility]  Spoke To: ED   Outcome: Asked to have the referral resent to [facility]. Done

## 2018-11-28 NOTE — DISCHARGE PLANNING
Received [facility] form at [phone]  Agency/Facility Name: [facility] [place]  Referral sent per [facility] form @ [phone]

## 2018-11-28 NOTE — ED NOTES
Med Rec complete per Pt's daughter and [facility] @ [phone]  Allergies Reviewed  No ABX in the last 30 days    Pt's daughter reports only medication Pt has been taking is LEVOTHYROXINE 50 mcg daily.  [facility] reports only medication filled since August is LEVOTHYROXINE 50 mcg
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
## 2018-11-28 NOTE — ED NOTES
Escorted patient to CT scan.  Pt tearful and scared.  Pt cooperative and very hesitant on entire procedure.  Pt returned to room via wheelchair and assisted back to bed.

## 2018-11-28 NOTE — DISCHARGE PLANNING
SW placed follow up call to [facility] requesting an update on when they may be here to evaluate this patient.  SW will await a call back.

## 2018-11-28 NOTE — DISCHARGE PLANNING
SW called [place] [facility] and was informed that there is just a couple people ahead of this patient and was told that they are hopefull that they will be able to accept her today.

## 2018-11-28 NOTE — DISCHARGE PLANNING
Copy of legal hold faxed to [facility] and requested placement referrals to be sent out.

## 2018-11-28 NOTE — DISCHARGE PLANNING
ALERT note:    Writer RN ARIANE for [facility] today 11/28/18  At 0750 (Medicaid HMO)  Madeline from [facility] called back today at 0915, clinician to be sent rom [facility] to evaluate pt. And will call [facility] Alert team after evaluation completed

## 2018-11-28 NOTE — PROGRESS NOTES
Patient remains safe in room under direct obs. PAtient remains restless. She is rocking back and forth and shaking her head. She remains unwilling to communicate.

## 2018-11-28 NOTE — DISCHARGE PLANNING
SW noted new order for tele-health, psych.  SW called and scheduled patient to be seen tomorrow (11-29) at 8:30 am.  ER staff aware.

## 2018-11-28 NOTE — ED NOTES
Assumed care while primary is at lunch, rn is concerned about odd behavior, not eating, pounding on chest, not communicative. ERP to reevaluate.   3

## 2018-11-29 ENCOUNTER — HOSPITAL ENCOUNTER (INPATIENT)
Facility: MEDICAL CENTER | Age: 53
End: 2018-11-29
Attending: PSYCHIATRY & NEUROLOGY
Payer: MEDICAID

## 2018-11-29 ENCOUNTER — APPOINTMENT (OUTPATIENT)
Dept: SCHEDULING | Facility: IMAGING CENTER | Age: 53
End: 2018-11-29
Payer: MEDICAID

## 2018-11-29 VITALS
TEMPERATURE: 99.2 F | BODY MASS INDEX: 36.36 KG/M2 | SYSTOLIC BLOOD PRESSURE: 163 MMHG | HEIGHT: 59 IN | RESPIRATION RATE: 20 BRPM | DIASTOLIC BLOOD PRESSURE: 97 MMHG | WEIGHT: 180.34 LBS | HEART RATE: 96 BPM | OXYGEN SATURATION: 91 %

## 2018-11-29 LAB — THYROPEROXIDASE AB SERPL-ACNC: 379.7 IU/ML (ref 0–9)

## 2018-11-29 PROCEDURE — A9270 NON-COVERED ITEM OR SERVICE: HCPCS

## 2018-11-29 PROCEDURE — A9270 NON-COVERED ITEM OR SERVICE: HCPCS | Performed by: PSYCHIATRY & NEUROLOGY

## 2018-11-29 PROCEDURE — 700102 HCHG RX REV CODE 250 W/ 637 OVERRIDE(OP): Performed by: PSYCHIATRY & NEUROLOGY

## 2018-11-29 PROCEDURE — 99245 OFF/OP CONSLTJ NEW/EST HI 55: CPT | Performed by: PSYCHIATRY & NEUROLOGY

## 2018-11-29 PROCEDURE — 700102 HCHG RX REV CODE 250 W/ 637 OVERRIDE(OP)

## 2018-11-29 PROCEDURE — 86376 MICROSOMAL ANTIBODY EACH: CPT

## 2018-11-29 RX ORDER — DIVALPROEX SODIUM 250 MG/1
500 TABLET, EXTENDED RELEASE ORAL EVERY MORNING
Status: DISCONTINUED | OUTPATIENT
Start: 2018-11-29 | End: 2018-11-29 | Stop reason: HOSPADM

## 2018-11-29 RX ORDER — HALOPERIDOL 5 MG/ML
5 INJECTION INTRAMUSCULAR
Status: DISCONTINUED | OUTPATIENT
Start: 2018-11-29 | End: 2018-11-29 | Stop reason: HOSPADM

## 2018-11-29 RX ORDER — OLANZAPINE 5 MG/1
TABLET, ORALLY DISINTEGRATING ORAL
Status: COMPLETED
Start: 2018-11-29 | End: 2018-11-29

## 2018-11-29 RX ORDER — HALOPERIDOL 5 MG/1
5 TABLET ORAL
Status: DISCONTINUED | OUTPATIENT
Start: 2018-11-29 | End: 2018-11-29 | Stop reason: HOSPADM

## 2018-11-29 RX ORDER — DIVALPROEX SODIUM 250 MG/1
1000 TABLET, EXTENDED RELEASE ORAL
Status: DISCONTINUED | OUTPATIENT
Start: 2018-11-29 | End: 2018-11-29 | Stop reason: HOSPADM

## 2018-11-29 RX ORDER — OLANZAPINE 5 MG/1
5 TABLET, ORALLY DISINTEGRATING ORAL ONCE
Status: COMPLETED | OUTPATIENT
Start: 2018-11-29 | End: 2018-11-29

## 2018-11-29 RX ORDER — RISPERIDONE 1 MG/1
1 TABLET ORAL 2 TIMES DAILY
Status: DISCONTINUED | OUTPATIENT
Start: 2018-11-29 | End: 2018-11-29 | Stop reason: HOSPADM

## 2018-11-29 RX ORDER — LORAZEPAM 2 MG/ML
2 INJECTION INTRAMUSCULAR
Status: DISCONTINUED | OUTPATIENT
Start: 2018-11-29 | End: 2018-11-29 | Stop reason: HOSPADM

## 2018-11-29 RX ORDER — LORAZEPAM 1 MG/1
1 TABLET ORAL 3 TIMES DAILY
Status: DISCONTINUED | OUTPATIENT
Start: 2018-11-29 | End: 2018-11-29 | Stop reason: HOSPADM

## 2018-11-29 RX ORDER — DIPHENHYDRAMINE HYDROCHLORIDE 50 MG/ML
50 INJECTION INTRAMUSCULAR; INTRAVENOUS
Status: DISCONTINUED | OUTPATIENT
Start: 2018-11-29 | End: 2018-11-29 | Stop reason: HOSPADM

## 2018-11-29 RX ORDER — DIPHENHYDRAMINE HCL 25 MG
50 TABLET ORAL
Status: DISCONTINUED | OUTPATIENT
Start: 2018-11-29 | End: 2018-11-29 | Stop reason: HOSPADM

## 2018-11-29 RX ADMIN — OLANZAPINE 5 MG: 5 TABLET, ORALLY DISINTEGRATING ORAL at 03:12

## 2018-11-29 RX ADMIN — DIVALPROEX SODIUM 500 MG: 250 TABLET, EXTENDED RELEASE ORAL at 09:54

## 2018-11-29 NOTE — ED NOTES
Patient tearful at this time. Patient denies having any needs at this time. Close observation in place.

## 2018-11-29 NOTE — ED NOTES
Pt able to communicate with unit clerk at this time, pt not responding verbally to this RN, pt educated about tele psych conference.
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
## 2018-11-29 NOTE — ED NOTES
"Pt resting in bed with 1:1 sitter for wondering, pt alert to person, skin pink and warm, pt able to sit in bed without assistance, pt redirected to stay in bed, pt responded \"you guys are keeping me here for days\" pt redirected on correct time of events, no more needs stated.   "

## 2018-11-29 NOTE — ED NOTES
Assumed care of pt at this specific time; no acute exacerbations in condition are noted since last evaluation. She continues to be observed closely.

## 2018-11-29 NOTE — ED NOTES
Pt appears to be resting with her eyes open, in nonapparent distress.  She is visualized closely from nursing desk.

## 2018-11-29 NOTE — ED NOTES
Medical report has been relayed to Bruna JIANG, calling from [place]. She will process the necessary documentation for possible transfer.

## 2018-11-29 NOTE — DISCHARGE PLANNING
SW received phone call from [facility] accepting this patient.  Accepting MD is Ricco.  [facility] PCA form completed and faxed to [facility].

## 2018-11-29 NOTE — ED NOTES
Transferred care to [facility].  Pt is ready to be transferred to [place].

## 2018-11-29 NOTE — ED NOTES
[facility] called for report, pt denied at this time due to needing complex care, Khadra will call back to get an update on pt.

## 2018-11-29 NOTE — ED NOTES
Pt awake looking at the ceiling, not answering questions, V.S. Stable and documented, skin pink and warm, pt asked her name and touch her arm but still no response, will cont. Monitoring.

## 2019-01-01 NOTE — OR NURSING
Patient to PACU with ET tube in place. Extubated by Dr Bhatti and placed on mask 02 @5L. She is now on RA and awake. She does not respond verbally or make eye contact with me. She is calm and does not appear to be aggressive at this time. Report called to Rn and pt transported safely to her room.    
[employer]

## 2019-01-07 ENCOUNTER — OFFICE VISIT (OUTPATIENT)
Dept: NEUROLOGY | Facility: MEDICAL CENTER | Age: 54
End: 2019-01-07
Payer: MEDICAID

## 2019-01-07 VITALS
OXYGEN SATURATION: 95 % | TEMPERATURE: 97.8 F | SYSTOLIC BLOOD PRESSURE: 120 MMHG | BODY MASS INDEX: 34.68 KG/M2 | HEART RATE: 104 BPM | DIASTOLIC BLOOD PRESSURE: 72 MMHG | HEIGHT: 59 IN | WEIGHT: 172 LBS

## 2019-01-07 DIAGNOSIS — G21.11 NEUROLEPTIC-INDUCED PARKINSONISM (HCC): ICD-10-CM

## 2019-01-07 DIAGNOSIS — T43.505A NEUROLEPTIC-INDUCED PARKINSONISM (HCC): ICD-10-CM

## 2019-01-07 DIAGNOSIS — E06.3 HASHIMOTO'S DISEASE: ICD-10-CM

## 2019-01-07 DIAGNOSIS — G93.40 ACUTE ENCEPHALOPATHY: Primary | ICD-10-CM

## 2019-01-07 PROCEDURE — 99205 OFFICE O/P NEW HI 60 MIN: CPT | Performed by: PSYCHIATRY & NEUROLOGY

## 2019-01-07 ASSESSMENT — ENCOUNTER SYMPTOMS
MYALGIAS: 0
DEPRESSION: 0
SEIZURES: 0
HEADACHES: 0
DIZZINESS: 0
MEMORY LOSS: 1
FALLS: 0
CONSTIPATION: 0
HALLUCINATIONS: 0
LOSS OF CONSCIOUSNESS: 0
INSOMNIA: 1

## 2019-01-07 ASSESSMENT — PATIENT HEALTH QUESTIONNAIRE - PHQ9: CLINICAL INTERPRETATION OF PHQ2 SCORE: 0

## 2019-01-07 ASSESSMENT — LIFESTYLE VARIABLES: SUBSTANCE_ABUSE: 0
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
## 2019-01-16 ENCOUNTER — TELEPHONE (OUTPATIENT)
Dept: INTERNAL MEDICINE | Facility: MEDICAL CENTER | Age: 54
End: 2019-01-16

## 2019-01-16 NOTE — TELEPHONE ENCOUNTER
"1. Caller Name: Son in law (\"caregiver\")                      Call Back Number: [phone]    2. Message: Son in law called stating patient has been taking her Abilify but is having sever symptoms. Has some questions on her meds.    3. Patient approves office to leave a detailed voicemail/[employer] message: N\A    Sent patient a mychart message as patient's son in law is not in her chart stating it is okay to discuss any medical information with him. As well if she is having symptoms to please visit an urgent care or make an appointment at our office  "

## 2019-01-18 NOTE — TELEPHONE ENCOUNTER
Michaela Infante M.D.  Nataly Nava, Med Ass't   Caller: Unspecified (2 days ago,  2:41 PM)             Also, Abilify was prescribed by her Neurologist Dr. Gonzales and I would like her to follow up with him if she is having neurological symptoms.       Thank you.

## 2019-01-29 ENCOUNTER — OFFICE VISIT (OUTPATIENT)
Dept: INTERNAL MEDICINE | Facility: MEDICAL CENTER | Age: 54
End: 2019-01-29
Payer: MEDICAID

## 2019-01-29 VITALS
HEIGHT: 58 IN | DIASTOLIC BLOOD PRESSURE: 70 MMHG | HEART RATE: 85 BPM | TEMPERATURE: 97.9 F | SYSTOLIC BLOOD PRESSURE: 120 MMHG | WEIGHT: 175.63 LBS | OXYGEN SATURATION: 91 % | BODY MASS INDEX: 36.86 KG/M2

## 2019-01-29 DIAGNOSIS — H91.92 HEARING LOSS OF LEFT EAR, UNSPECIFIED HEARING LOSS TYPE: ICD-10-CM

## 2019-01-29 DIAGNOSIS — F41.9 ANXIETY AND DEPRESSION: ICD-10-CM

## 2019-01-29 DIAGNOSIS — G47.09 OTHER INSOMNIA: ICD-10-CM

## 2019-01-29 DIAGNOSIS — F32.A ANXIETY AND DEPRESSION: ICD-10-CM

## 2019-01-29 DIAGNOSIS — F20.89 PSYCHOSIS, SCHIZOPHRENIA, SIMPLE (HCC): ICD-10-CM

## 2019-01-29 DIAGNOSIS — E06.3 HASHIMOTO'S DISEASE: ICD-10-CM

## 2019-01-29 PROCEDURE — 99214 OFFICE O/P EST MOD 30 MIN: CPT | Mod: GC | Performed by: INTERNAL MEDICINE

## 2019-01-29 RX ORDER — TRAZODONE HYDROCHLORIDE 50 MG/1
50 TABLET ORAL NIGHTLY PRN
Qty: 30 TAB | Refills: 3 | Status: SHIPPED | OUTPATIENT
Start: 2019-01-29 | End: 2019-06-30

## 2019-01-29 ASSESSMENT — ENCOUNTER SYMPTOMS
ABDOMINAL PAIN: 0
FLANK PAIN: 0
HEADACHES: 0
DEPRESSION: 1
DIZZINESS: 0
SEIZURES: 0
BLURRED VISION: 0
HALLUCINATIONS: 0
TREMORS: 0
ORTHOPNEA: 0
MYALGIAS: 0
SHORTNESS OF BREATH: 0
FEVER: 0
WEIGHT LOSS: 0
INSOMNIA: 1
SORE THROAT: 0
EYE PAIN: 0
HEARTBURN: 0
CONSTIPATION: 0
COUGH: 0
NERVOUS/ANXIOUS: 1
DIARRHEA: 0
CHILLS: 0
BLOOD IN STOOL: 0
PALPITATIONS: 0

## 2019-01-29 ASSESSMENT — LIFESTYLE VARIABLES: SUBSTANCE_ABUSE: 0

## 2019-01-30 ENCOUNTER — PATIENT OUTREACH (OUTPATIENT)
Dept: HEALTH INFORMATION MANAGEMENT | Facility: OTHER | Age: 54
End: 2019-01-30

## 2019-01-30 NOTE — PROGRESS NOTES
Established Patient    Vilma presents today with the following:    CC: Antipsychotic medication side effects     HPI: Vilma Rod is a 53 year old female with a past medical history of Acute psychotic episode and hashimoto's thyroiditis, who presented to the office for a hospital follow up visit after a recent psychotic episode, triggered by a family issue,  which required hospitalization.     Psychosis/ Schizophrenia: Pt was admitted to [facility] and was started on [place]  Abilify at that time and she continues to receive them every month, her last dose was on 1/7/2019. She was seen by neurologist Dr. Gonzales who recommended titrating down the dose of Abilify given patien'ts new symptoms of slowing cognitive function and well as motor function and speech. Per her son in law, she sometimes freezes in certain postures, however denies tremors or purposeless repetitive movements. Pt and her Son in law report that she has had no hallucinations after the recent one in November.    Depression & anxiety: Per patient's son in law, pt has decreased appetite, does not enjoy going out like she did in the past and has worsening anxiety. Pt is not on any antidepressants or anxiolytics.     Hypothyroidism: Pt is on 50mcg of Synthroid. Reports fatigue and hot flashes, no wt change or change in bowel and bladder habits.     Hearing loss in left ear: pt reports difficulty hearing in her left ear both in quiet and noisy environment and mostly to high pitched voices. Denies vertigo or dizziness.    Insomnia: Pt reports new onset insomnia since the time she started taking Abilify. Per son in law she is also unable to fall asleep due to anxiety and depression.       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 30 Tab 3   • ARIPiprazole ER (ABILIFY MAINTENA) 400 MG Suspension Reconstituted ER by Intramuscular route.     • levothyroxine (SYNTHROID) 50 MCG Tab Take 1 Tab by mouth Every morning on an empty stomach. 35 Tab 0     No current facility-administered medications for this visit.        Review of Systems   Constitutional: Positive for malaise/fatigue. Negative for chills, fever and weight loss.   HENT: Positive for hearing loss (left ear ,). Negative for congestion, ear pain, sore throat and tinnitus.    Eyes: Negative for blurred vision and pain.   Respiratory: Negative for cough and shortness of breath.    Cardiovascular: Negative for chest pain, palpitations, orthopnea and leg swelling.   Gastrointestinal: Negative for abdominal pain, blood in stool, constipation, diarrhea and heartburn.   Genitourinary: Negative for dysuria and flank pain.   Musculoskeletal: Negative for joint pain and myalgias.   Skin: Negative for itching and rash.   Neurological: Negative for dizziness, tremors, seizures and headaches.   Psychiatric/Behavioral: Positive for depression. Negative for hallucinations, substance abuse and suicidal ideas. The patient is nervous/anxious and has insomnia.          /70 (BP Location: Left arm, Patient Position: Sitting, BP Cuff Size: Large adult)   Pulse 85   Temp 36.6 °C (97.9 °F) (Temporal)   Ht 1.473 m (4' 10\")   Wt 79.7 kg (175 " lb 10.1 oz)   LMP 07/02/2011 (Approximate)   SpO2 91%   BMI 36.71 kg/m²     Physical Exam   Constitutional: She is oriented to person, place, and time. No distress.   HENT:   Mouth/Throat: Oropharynx is clear and moist.   Eyes: Conjunctivae are normal. No scleral icterus.   Neck: No thyromegaly present.   Cardiovascular: Normal rate, regular rhythm and normal heart sounds.    No murmur heard.  Pulmonary/Chest: Breath sounds normal. She has no wheezes.   Abdominal: Soft. Bowel sounds are normal. She exhibits no distension. There is no tenderness.   Musculoskeletal: She exhibits no edema or deformity.   Lymphadenopathy:     She has no cervical adenopathy.   Neurological: She is alert and oriented to person, place, and time. No cranial nerve deficit.   Mild rigidity in her upper extremities. No resting tremor. Gait normal.    Skin: No rash noted.   Psychiatric: Her mood appears anxious. She is agitated (mildly nervous). She expresses no suicidal plans.       Note: I have reviewed all pertinent labs and diagnostic tests associated with this visit with specific comments listed under the assessment and plan below    Assessment and Plan    1. Psychosis, schizophrenia, simple (HCC)  - Prior to d/c form [place] pt was asked to follow up with Behavioral Health and pt has scheduled an appointment with a therapist, however she has no follow up with a psychiatrist.   - Referral to Psychiatry has been placed, MA spoke with [facility] and was able to get an appointment for 1/31/2019, however pt's son in law, who is the only person in the family that has a license to drive is working on that day. Given pt's history of hallucinations &  impaired memory, family would like to be present for the visit and pt's daughter has kids to take care of and is unable to accompany her mother. I also offered to write pt's Son-in-law's employer a letter, but he is unwilling to take off from work for loss of pay.    - Encouraged the  patient to keep the appointment as the next available appointment is in April.   - Referral to complex care management placed.     2.  Anxiety and depression   - Was on Ativan for anxiety for a shirt period of time, not on any antidepressants or anxiolytics.   - Worsening anxiety > depression.    - Referral placed to psychiatry.    3. Hashimoto's disease  - Tsh on 11/2081 at 5.32 form 7.02 in 7/2018   - Continue 50mcg of synthroid.   - Follow up TSH with T3/T4, CBC and CMP ordered     4. Hearing loss of left ear, unspecified hearing loss type  - Referral placed to audiology     5. Insomnia  - Difficulty falling asleep and decreased quality of sleep; patient and family believe this is secondary to her medication-Abilify  - Prescribed trazodone 50 mg to help with sleep as well as a mood elevator given her symptoms of anxiety and depression.     Followup: Return in about 5 weeks (around 3/5/2019) for [facility].      Signed by: Michaela Infante M.D.

## 2019-02-07 DIAGNOSIS — E03.8 HYPOTHYROIDISM DUE TO HASHIMOTO'S THYROIDITIS: ICD-10-CM

## 2019-02-07 DIAGNOSIS — E06.3 HYPOTHYROIDISM DUE TO HASHIMOTO'S THYROIDITIS: ICD-10-CM

## 2019-02-07 NOTE — TELEPHONE ENCOUNTER
Last seen: 01/29/19 by Dr. Infante  Next appt: 05/13/19 with Dr. Infante    Was the patient seen in the last year in this department? Yes   Does patient have an active prescription for medications requested? No   Received Request Via: Pharmacy

## 2019-02-07 NOTE — TELEPHONE ENCOUNTER
From: Vilma Foster  Sent: 2/7/2019 11:48 AM PST  Subject: Medication Renewal Request    Vilma Foster would like a refill of the following medications:     levothyroxine (SYNTHROID) 50 MCG Tab [Michaela Infante M.D.]   Patient Comment: pharmacy only gave me a 5 day refill my last one    Preferred pharmacy: [id number] - [place], [place] - [address]

## 2019-02-08 RX ORDER — LEVOTHYROXINE SODIUM 0.05 MG/1
50 TABLET ORAL
Qty: 30 TAB | Refills: 3 | Status: SHIPPED | OUTPATIENT
Start: 2019-02-08 | End: 2019-03-10

## 2019-05-22 DIAGNOSIS — G47.09 OTHER INSOMNIA: ICD-10-CM

## 2019-05-22 RX ORDER — TRAZODONE HYDROCHLORIDE 50 MG/1
50 TABLET ORAL NIGHTLY PRN
Refills: 3 | OUTPATIENT
Start: 2019-05-22

## 2019-06-30 ENCOUNTER — HOSPITAL ENCOUNTER (EMERGENCY)
Facility: MEDICAL CENTER | Age: 54
End: 2019-06-30
Attending: EMERGENCY MEDICINE
Payer: MEDICAID

## 2019-06-30 VITALS
HEIGHT: 59 IN | TEMPERATURE: 98.7 F | DIASTOLIC BLOOD PRESSURE: 70 MMHG | HEART RATE: 75 BPM | OXYGEN SATURATION: 96 % | SYSTOLIC BLOOD PRESSURE: 124 MMHG | BODY MASS INDEX: 34.8 KG/M2 | RESPIRATION RATE: 16 BRPM | WEIGHT: 172.62 LBS

## 2019-06-30 DIAGNOSIS — F20.9 SCHIZOPHRENIA, UNSPECIFIED TYPE (HCC): ICD-10-CM

## 2019-06-30 DIAGNOSIS — T45.0X2A DIPHENHYDRAMINE OVERDOSE, INTENTIONAL SELF-HARM, INITIAL ENCOUNTER (HCC): ICD-10-CM

## 2019-06-30 DIAGNOSIS — R45.851 SUICIDAL IDEATION: ICD-10-CM

## 2019-06-30 LAB
ALBUMIN SERPL BCP-MCNC: 3.7 G/DL (ref 3.2–4.9)
ALBUMIN/GLOB SERPL: 1.1 G/DL
ALP SERPL-CCNC: 89 U/L (ref 30–99)
ALT SERPL-CCNC: 15 U/L (ref 2–50)
AMPHETAMINES UR QL SCN: NEGATIVE
ANION GAP SERPL CALC-SCNC: 11 MMOL/L (ref 0–11.9)
APAP SERPL-MCNC: <10 UG/ML (ref 10–30)
APAP SERPL-MCNC: <10 UG/ML (ref 10–30)
AST SERPL-CCNC: 21 U/L (ref 12–45)
BARBITURATES UR QL SCN: NEGATIVE
BASOPHILS # BLD AUTO: 0.5 % (ref 0–1.8)
BASOPHILS # BLD: 0.05 K/UL (ref 0–0.12)
BENZODIAZ UR QL SCN: NEGATIVE
BILIRUB SERPL-MCNC: 0.3 MG/DL (ref 0.1–1.5)
BUN SERPL-MCNC: 16 MG/DL (ref 8–22)
CALCIUM SERPL-MCNC: 9.3 MG/DL (ref 8.4–10.2)
CHLORIDE SERPL-SCNC: 107 MMOL/L (ref 96–112)
CO2 SERPL-SCNC: 26 MMOL/L (ref 20–33)
COCAINE UR QL SCN: NEGATIVE
CREAT SERPL-MCNC: 1.09 MG/DL (ref 0.5–1.4)
EKG IMPRESSION: NORMAL
EOSINOPHIL # BLD AUTO: 0.07 K/UL (ref 0–0.51)
EOSINOPHIL NFR BLD: 0.8 % (ref 0–6.9)
ERYTHROCYTE [DISTWIDTH] IN BLOOD BY AUTOMATED COUNT: 41.1 FL (ref 35.9–50)
ETHANOL BLD-MCNC: 0 G/DL
GLOBULIN SER CALC-MCNC: 3.3 G/DL (ref 1.9–3.5)
GLUCOSE SERPL-MCNC: 187 MG/DL (ref 65–99)
HCT VFR BLD AUTO: 41.5 % (ref 37–47)
HGB BLD-MCNC: 13.7 G/DL (ref 12–16)
IMM GRANULOCYTES # BLD AUTO: 0.03 K/UL (ref 0–0.11)
IMM GRANULOCYTES NFR BLD AUTO: 0.3 % (ref 0–0.9)
INR PPP: 0.93 (ref 0.87–1.13)
LYMPHOCYTES # BLD AUTO: 2.55 K/UL (ref 1–4.8)
LYMPHOCYTES NFR BLD: 27.3 % (ref 22–41)
MCH RBC QN AUTO: 28.8 PG (ref 27–33)
MCHC RBC AUTO-ENTMCNC: 33 G/DL (ref 33.6–35)
MCV RBC AUTO: 87.4 FL (ref 81.4–97.8)
MONOCYTES # BLD AUTO: 0.61 K/UL (ref 0–0.85)
MONOCYTES NFR BLD AUTO: 6.5 % (ref 0–13.4)
NEUTROPHILS # BLD AUTO: 6.02 K/UL (ref 2–7.15)
NEUTROPHILS NFR BLD: 64.6 % (ref 44–72)
NRBC # BLD AUTO: 0 K/UL
NRBC BLD-RTO: 0 /100 WBC
OPIATES UR QL SCN: NEGATIVE
PCP UR QL SCN: NEGATIVE
PLATELET # BLD AUTO: 281 K/UL (ref 164–446)
PMV BLD AUTO: 10.2 FL (ref 9–12.9)
POTASSIUM SERPL-SCNC: 4.6 MMOL/L (ref 3.6–5.5)
PROT SERPL-MCNC: 7 G/DL (ref 6–8.2)
PROTHROMBIN TIME: 12.5 SEC (ref 12–14.6)
RBC # BLD AUTO: 4.75 M/UL (ref 4.2–5.4)
SALICYLATES SERPL-MCNC: <4 MG/DL (ref 15–25)
SODIUM SERPL-SCNC: 144 MMOL/L (ref 135–145)
THC UR QL SCN: NEGATIVE
TSH SERPL DL<=0.005 MIU/L-ACNC: 8.65 UIU/ML (ref 0.38–5.33)
WBC # BLD AUTO: 9.3 K/UL (ref 4.8–10.8)

## 2019-06-30 PROCEDURE — 85610 PROTHROMBIN TIME: CPT

## 2019-06-30 PROCEDURE — 80053 COMPREHEN METABOLIC PANEL: CPT

## 2019-06-30 PROCEDURE — 80305 DRUG TEST PRSMV DIR OPT OBS: CPT

## 2019-06-30 PROCEDURE — 85025 COMPLETE CBC W/AUTO DIFF WBC: CPT

## 2019-06-30 PROCEDURE — 93005 ELECTROCARDIOGRAM TRACING: CPT | Performed by: EMERGENCY MEDICINE

## 2019-06-30 PROCEDURE — 700102 HCHG RX REV CODE 250 W/ 637 OVERRIDE(OP): Performed by: EMERGENCY MEDICINE

## 2019-06-30 PROCEDURE — A9270 NON-COVERED ITEM OR SERVICE: HCPCS | Performed by: EMERGENCY MEDICINE

## 2019-06-30 PROCEDURE — 80307 DRUG TEST PRSMV CHEM ANLYZR: CPT

## 2019-06-30 PROCEDURE — 99285 EMERGENCY DEPT VISIT HI MDM: CPT

## 2019-06-30 PROCEDURE — 84443 ASSAY THYROID STIM HORMONE: CPT

## 2019-06-30 RX ORDER — LEVOTHYROXINE SODIUM 0.07 MG/1
75 TABLET ORAL
COMMUNITY

## 2019-06-30 RX ORDER — ST. JOHN'S WORT 300 MG
1 CAPSULE ORAL 2 TIMES DAILY
Status: SHIPPED | COMMUNITY
End: 2021-11-04

## 2019-06-30 RX ORDER — CHLORAL HYDRATE 500 MG
1000 CAPSULE ORAL DAILY
Status: SHIPPED | COMMUNITY
End: 2021-11-04

## 2019-06-30 RX ORDER — LEVOTHYROXINE SODIUM 0.05 MG/1
75 TABLET ORAL
Status: DISCONTINUED | OUTPATIENT
Start: 2019-06-30 | End: 2019-06-30 | Stop reason: HOSPADM

## 2019-06-30 RX ADMIN — LEVOTHYROXINE SODIUM 75 MCG: 50 TABLET ORAL at 05:21

## 2019-06-30 NOTE — ED NOTES
Assumed care of pt. Pt resting comfortably, sitter in place for close obs. Will monitor frequently.

## 2019-06-30 NOTE — ED NOTES
Telemedicine completed. Pt did not speak with lifeskills, cried when questioned. Emotional support provided.

## 2019-06-30 NOTE — ED NOTES
Pt updated regarding telemedicine. Refusing to sign consent form.  Computer set up at bedside for pending call at 0900.

## 2019-06-30 NOTE — ED NOTES
Pt assisted to commode. Pt able to urinate. Urine sample sent to lab. Pt continues to be minimally verbally responsive to staff. Offered pt food and drink. Pt took a couple small sips of water. Blankets provided. Pt medically cleared by Dr. Caputo. Monitoring equipment removed from room. IV discontinued. Pt resting in bed with sitter at bedside. Pt continues to be tearful.

## 2019-06-30 NOTE — ED TRIAGE NOTES
"Pt arrives with son in law for what son in law states is a \"schizophrenia break down\". He states that they have been out of town for the past week for a , arrived back home tonight and noticed these signs of instability. Pt anxious, agitated, crying, not responding to questions. They state that death is a trigger for pt. Family states that pt attempted to drink [employer] brand \"Zzzquil\", but family was able to get it away from her after \"two gulps\".   "

## 2019-06-30 NOTE — ED NOTES
Pt crying in bed. kleenex provided. Pt denies needs. Stating she just wants to cry.  Sitter remains in place. Emotional support provided.

## 2019-06-30 NOTE — ED NOTES
Faxed all paper to following facilities: [facility], [facility], [facility], [facility]

## 2019-06-30 NOTE — ED PROVIDER NOTES
"ED Provider Note    CHIEF COMPLAINT  Chief Complaint   Patient presents with   • Psych Eval     hx of schizophrenia and depression, family reports pt acting about to have a \"breakdown\".    • Suicidal Ideation     vague thoughts       HPI  Vilma Foster is a 54 y.o. female with past medical hx encephalopathy, schizophrenia, who presents brought in by family for concerns of worsening suicidal behavior and \"schizophrenic breakdown.\"      History has been obtained from daughter, Nia, as patient is currently nonverbal.  Nia can be reached at the following phone number: [phone].    Check is Nia states that they work on their way back from a family , when patient began to behave erratically and not like her usual self.  These are warning signs that she is about to have a breakdown.  Her last breakdown was a few months ago when she had an admission [facility] at that time.  Has been doing relatively well at home since then, but death is a \"trigger\" per daughter for her breakdowns.  Once they got home patient began to wander around the house, have vague empty staring episodes, and daughter was concerned shows she started to follow her mother.  Mother then picked up a bottle of [employer] alcohol free nighttime sleep aid, ingredients specifically of diphenhydramine 50 mg per 30 mL's, and started to try and drink the medication.  Daughter took rest of the medication away from the patient, but estimates that patient was able to get in about 2 gulps prior to having the medication removed from her.  She is also on levothyroxine as well as Keeler's wort but did not take any of those medications. Daughter states that patient was due for refill of her levothyroxine 1 month ago, but has not filled the medication.    Time of ingestion: Between 12 AM and 12:15 AM.    Due to the worsening erratic behavior over the past 4 hours, this attempted to consume large amounts of Benadryl, some vague suicidal " "statements, specifically \"I do not want to live,\" patient is brought in here for further evaluation.    REVIEW OF SYSTEMS  Review of systems Limited as patient refuses to speak.    PAST MEDICAL HISTORY   has a past medical history of Head ache; Hearing loss; and Hypertriglyceridemia.    SOCIAL HISTORY  Social History     Social History Main Topics   • Smoking status: Never Smoker   • Smokeless tobacco: Never Used   • Alcohol use No   • Drug use: No   • Sexual activity: Not on file       SURGICAL HISTORY   has a past surgical history that includes gyn surgery.    CURRENT MEDICATIONS  Home Medications    **Home medications have not yet been reviewed for this encounter**         ALLERGIES  No Known Allergies    PHYSICAL EXAM  VITAL SIGNS: BP (!) 181/95   Pulse (!) 134   Temp 37.1 °C (98.7 °F) (Temporal)   Resp (!) 25   Ht 1.499 m (4' 11\")   Wt 78.3 kg (172 lb 9.9 oz)   LMP 07/02/2011 (Approximate)   SpO2 97%   BMI 34.87 kg/m²   Pulse ox interpretation: I interpret this pulse ox as normal.  Constitutional: Alert, crying, refusing to speak  HEENT: EOMI, PERRL, mucous membranes moist, posterior OP clear without exudate   Neck: supple, no cervical lymphadenopathy  Cardiovascular:  Regular rate and rhythm without murmurs  Chest wall: No tenderness to palpation   Thorax & Lungs: clear to auscultation bilaterally, no increased WOB, no wheeze or rhonchi   Abdomen: Soft throughout without rebound or guarding, no CVAT   Skin: Warm, Dry, No erythema, no rash   Musculoskeletal: Good range of motion in all major joints.    Neurologic:  Alert and oriented, without focal deficits. Fluent speech.   Psychiatric: Affect normal, Judgment normal, Mood normal.       DIAGNOSTIC STUDIES / PROCEDURES    EKG  See below    LABS  Results for orders placed or performed during the hospital encounter of 06/30/19   CBC WITH DIFFERENTIAL   Result Value Ref Range    WBC 9.3 4.8 - 10.8 K/uL    RBC 4.75 4.20 - 5.40 M/uL    Hemoglobin 13.7 12.0 - " 16.0 g/dL    Hematocrit 41.5 37.0 - 47.0 %    MCV 87.4 81.4 - 97.8 fL    MCH 28.8 27.0 - 33.0 pg    MCHC 33.0 (L) 33.6 - 35.0 g/dL    RDW 41.1 35.9 - 50.0 fL    Platelet Count 281 164 - 446 K/uL    MPV 10.2 9.0 - 12.9 fL    Neutrophils-Polys 64.60 44.00 - 72.00 %    Lymphocytes 27.30 22.00 - 41.00 %    Monocytes 6.50 0.00 - 13.40 %    Eosinophils 0.80 0.00 - 6.90 %    Basophils 0.50 0.00 - 1.80 %    Immature Granulocytes 0.30 0.00 - 0.90 %    Nucleated RBC 0.00 /100 WBC    Neutrophils (Absolute) 6.02 2.00 - 7.15 K/uL    Lymphs (Absolute) 2.55 1.00 - 4.80 K/uL    Monos (Absolute) 0.61 0.00 - 0.85 K/uL    Eos (Absolute) 0.07 0.00 - 0.51 K/uL    Baso (Absolute) 0.05 0.00 - 0.12 K/uL    Immature Granulocytes (abs) 0.03 0.00 - 0.11 K/uL    NRBC (Absolute) 0.00 K/uL   COMP METABOLIC PANEL   Result Value Ref Range    Sodium 144 135 - 145 mmol/L    Potassium 4.6 3.6 - 5.5 mmol/L    Chloride 107 96 - 112 mmol/L    Co2 26 20 - 33 mmol/L    Anion Gap 11.0 0.0 - 11.9    Glucose 187 (H) 65 - 99 mg/dL    Bun 16 8 - 22 mg/dL    Creatinine 1.09 0.50 - 1.40 mg/dL    Calcium 9.3 8.4 - 10.2 mg/dL    AST(SGOT) 21 12 - 45 U/L    ALT(SGPT) 15 2 - 50 U/L    Alkaline Phosphatase 89 30 - 99 U/L    Total Bilirubin 0.3 0.1 - 1.5 mg/dL    Albumin 3.7 3.2 - 4.9 g/dL    Total Protein 7.0 6.0 - 8.2 g/dL    Globulin 3.3 1.9 - 3.5 g/dL    A-G Ratio 1.1 g/dL   DIAGNOSTIC ALCOHOL   Result Value Ref Range    Diagnostic Alcohol 0.00 0.00 g/dL   Salicylate   Result Value Ref Range    Salicylates, Quant. <4 (L) 15 - 25 mg/dL   ACETAMINOPHEN   Result Value Ref Range    Acetaminophen -Tylenol <10 10 - 30 ug/mL   PT/INR   Result Value Ref Range    PT 12.5 12.0 - 14.6 sec    INR 0.93 0.87 - 1.13   ESTIMATED GFR   Result Value Ref Range    GFR If African American >60 >60 mL/min/1.73 m 2    GFR If Non African American 52 (A) >60 mL/min/1.73 m 2   ACETAMINOPHEN   Result Value Ref Range    Acetaminophen -Tylenol <10 10 - 30 ug/mL   TSH   Result Value Ref Range     TSH 8.650 (H) 0.380 - 5.330 uIU/mL   EKG   Result Value Ref Range    Report       [facility] Emergency Dept.    Test Date:  2019  Pt Name:    JESSICA HOANG                 [facility]  MRN:        [id number]                      [facility]  Gender:     Female                       Technician: MELINA  :        1965                   Requested By:IVY MENDOZA  Order #:    [id number]                    Reading MD: Ivy Mendoza MD    Measurements  Intervals                                Axis  Rate:       93                           P:          47  CT:         172                          QRS:        4  QRSD:       78                           T:          49  QT:         352  QTc:        438    Interpretive Statements  SINUS RHYTHM  Compared to ECG 2018 22:33:38  No significant changes    Electronically Signed On 2019 1:15:30 PDT by Ivy Mendoza MD             COURSE & MEDICAL DECISION MAKING  Nursing notes and vital signs were reviewed. (See chart for details)  The patient's medical  records were reviewed, history was obtained from the daughter.    54-year-old female with known history of schizophrenia, presents for evaluation brought in by family for suicidal gesture and worsening decompensation.  Patient attempted to consume her nighttime Benadryl medication, was caught by daughter, took in about 60 mg of Benadryl.  Time of ingestion about midnight. Physical exam notable for initially tachycardic which resolved once patient calmed down and got in gurney, HR now 90s. BP WNL, no hypoxia. Patient refuses to answer questions or make eye contact. There are no findings for self inflicted wounds. Physical exam is otherwise unremarkable. Given patient's obvious decompensation, attempt at ingestion and suicidal statements plan to medically clear with aspirin Tylenol levels, close monitoring, EKG, urine drug screen, and discussion with poison  control.    2:10 AM  Labs essentially within normal limits.  TSH pending.  EKG with normal intervals, sinus rhythm.  Normal QRS, normal QTC.  Reassuring for toxicologic event.    Case number [id number] with [place] poison control.  Recommend 6hour observation following time of ingestion.  Recommend 4-hour Tylenol level from time of ingestion.  If all the above is within normal limits then patient is medically cleared.    4:43 AM  TSH mildly elevated.  Consistent with not taking her 75 mcg dose of levothyroxine.  Do not feel that this is cause of patient's decompensation.  Repeat Tylenol within normal limits. Urine tox screen still pending. Patient will be medically cleared at 6 AM. Discussed with José Be, who will pass on to day team for evaluation at 7 am.       FINAL IMPRESSION  (R45.851) Suicidal ideation  (F20.9) Schizophrenia, unspecified type (HCC)  (T45.0X2A) Diphenhydramine overdose, intentional self-harm, initial encounter (HCC)      Electronically signed by: Ivy Caputo, 6/30/2019 12:51 AM      This dictation was created using voice recognition software. The accuracy of the dictation is limited to the abilities of the software. I expect there may be some errors of grammar and possibly content. The nursing notes were reviewed and certain aspects of this information were incorporated into this note.

## 2019-06-30 NOTE — ED NOTES
Therese from [facility] called Dr. Neal will be accepting the patient, requested patient be sent anytime after noon.

## 2019-06-30 NOTE — ED NOTES
Pt on 1:1 observation. Pt still not answering questions, though pt much more calm. Pt on cardiac monitor and pulse ox until medically cleared. Sitter at bedside.

## 2019-06-30 NOTE — ED NOTES
Pt asked to provide urine sample. Pt not responding to request, making no attempt to go to restroom. Will try again later.

## 2019-06-30 NOTE — PROGRESS NOTES
Med rec updated and complete  Allergies reviewed  Called pts daughter (Nia) @ [phone] to verify pts medications.  Pts daughter reports that pt received bad side affects to ABILIFY 400MG injections and TRAZODONE last dose was about 6 months ago, and RISPERIDONE 1MG last dose was 2 months ago.  Pts daughter reports no antibiotics in the last 2 weeks  Pts daughter reports that pt takes a sleep aid every evening about 30 ml, but started drinking her sleep aid liquid last night (6/29/2019) @ 2130, not sure how much pt drank.
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
## 2019-06-30 NOTE — ED NOTES
Repeat acetaminophen level sent to lab. Pt asked to provide urine sample. Pt again not responding to request. Pt currently more calm, though anxious and tearful. Pt has yet to communicate with staff aside from an occasional one word response. Sitter at bedside. Pt on cardiac monitor still.

## 2019-06-30 NOTE — ED NOTES
Danny the patients son in law came in for her belongings. Patients belongings were given to her son in law to take home.

## 2021-03-15 DIAGNOSIS — Z23 NEED FOR VACCINATION: ICD-10-CM

## 2021-11-04 RX ORDER — QUETIAPINE FUMARATE 25 MG/1
25 TABLET, FILM COATED ORAL
COMMUNITY

## 2021-11-04 RX ORDER — MULTIVIT-MIN/IRON/FOLIC ACID/K 18-600-40
1000 CAPSULE ORAL DAILY
COMMUNITY
End: 2023-05-23

## 2021-11-08 ENCOUNTER — OFFICE VISIT (OUTPATIENT)
Dept: INTERNAL MEDICINE | Facility: OTHER | Age: 56
End: 2021-11-08
Payer: MEDICAID

## 2021-11-08 VITALS
HEART RATE: 77 BPM | OXYGEN SATURATION: 98 % | HEIGHT: 59 IN | DIASTOLIC BLOOD PRESSURE: 84 MMHG | TEMPERATURE: 99 F | SYSTOLIC BLOOD PRESSURE: 134 MMHG | WEIGHT: 157.8 LBS | BODY MASS INDEX: 31.81 KG/M2

## 2021-11-08 DIAGNOSIS — T43.505A NEUROLEPTIC-INDUCED PARKINSONISM (HCC): ICD-10-CM

## 2021-11-08 DIAGNOSIS — R41.3 MEMORY LOSS: Primary | ICD-10-CM

## 2021-11-08 DIAGNOSIS — G21.11 NEUROLEPTIC-INDUCED PARKINSONISM (HCC): ICD-10-CM

## 2021-11-08 DIAGNOSIS — K08.9 POOR DENTITION: ICD-10-CM

## 2021-11-08 PROBLEM — G93.40 ACUTE ENCEPHALOPATHY: Status: RESOLVED | Noted: 2018-07-02 | Resolved: 2021-11-08

## 2021-11-08 PROBLEM — F23 ACUTE PSYCHOSIS (HCC): Status: RESOLVED | Noted: 2018-08-08 | Resolved: 2021-11-08

## 2021-11-08 PROCEDURE — G0506 COMP ASSES CARE PLAN CCM SVC: HCPCS | Performed by: INTERNAL MEDICINE

## 2021-11-08 PROCEDURE — 99487 CPLX CHRNC CARE 1ST 60 MIN: CPT | Performed by: INTERNAL MEDICINE

## 2021-11-08 SDOH — HEALTH STABILITY: PHYSICAL HEALTH: ON AVERAGE, HOW MANY DAYS PER WEEK DO YOU ENGAGE IN MODERATE TO STRENUOUS EXERCISE (LIKE A BRISK WALK)?: 7 DAYS

## 2021-11-08 SDOH — HEALTH STABILITY: PHYSICAL HEALTH: ON AVERAGE, HOW MANY MINUTES DO YOU ENGAGE IN EXERCISE AT THIS LEVEL?: 20 MIN

## 2021-11-08 NOTE — PROGRESS NOTES
Participants in Assessment  Patient and her son-in-law Danny were present for the assessment.    Patient Concerns  My therapist set it up due to my memory loss; and I want to understand the reason for my memory loss.     Family Concerns  Here to be here so she can articulate what has happened; fill in details during the episodes.    Family & Support System    Marriage- - 1994; no relastionships  Children:     Jadon- [place]- talk at least monthly  Nia- Lives with her and her family  Elyse- [place]- talks every few days.    [place] Social Network Scale    Family score =  13   Social score = 0   Total score = 13  Not at risk of social isolation    Current Living Environment    Description of home and household members  Lives with daughter, son-in-law Danny and their children; living with them since 2011- to help with a special needs grand luis fernando    Safety:     Do you have any assistive or adaptive equipment in your home? Such as handrails, grab bars, bath seat. No grab bars.      Do you feel safe in home? Yes     Do you feel safe in neighborhood?- Yes    Typical day (Sleep routine; activities)  Gets up 6am; help with the grand daughter sperical care needs  Takes a walk daily  Watch TV; read  Housework  Goes to bed by 9pm; goes to sleep after watching TV for 30 minutes  Up 1-2x/night to urinate  No naps    Appetite  Good    Exercise and social activity  Daily walk- 15 minutes    Yazidi-Spiritual/Cultural Systems    Social History     Socioeconomic History   • Marital status:      Spouse name: Not on file   • Number of children: 3   • Years of education: Not on file   • Highest education level: 11th grade   Occupational History   • Not on file   Tobacco Use   • Smoking status: Never Smoker   • Smokeless tobacco: Never Used   Vaping Use   • Vaping Use: Never used   Substance and Sexual Activity   • Alcohol use: Not Currently     Comment: Not since 2011- a couple of beers   • Drug use: Never   • Sexual  activity: Not on file   Other Topics Concern   • Not on file   Social History Narrative   • Not on file     Social Determinants of Health     Financial Resource Strain:    • Difficulty of Paying Living Expenses: Not on file   Food Insecurity:    • Worried About Running Out of Food in the Last Year: Not on file   • Ran Out of Food in the Last Year: Not on file   Transportation Needs:    • Lack of Transportation (Medical): Not on file   • Lack of Transportation (Non-Medical): Not on file   Physical Activity: Insufficiently Active   • Days of Exercise per Week: 7 days   • Minutes of Exercise per Session: 20 min   Stress:    • Feeling of Stress : Not on file   Social Connections:    • Frequency of Communication with Friends and Family: Not on file   • Frequency of Social Gatherings with Friends and Family: Not on file   • Attends Protestant Services: Not on file   • Active Member of Clubs or Organizations: Not on file   • Attends Club or Organization Meetings: Not on file   • Marital Status: Not on file   Intimate Partner Violence:    • Fear of Current or Ex-Partner: Not on file   • Emotionally Abused: Not on file   • Physically Abused: Not on file   • Sexually Abused: Not on file   Housing Stability:    • Unable to Pay for Housing in the Last Year: Not on file   • Number of Places Lived in the Last Year: Not on file   • Unstable Housing in the Last Year: Not on file       Education  11th grade    Work/  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  [employer]- 10 years     Does patient report current or past enlistment? No        Advance Directives    • Do you have Advanced Directives?  o Agents for DPOAHC- yes; Devyn  o Agents for DPOA Finances- No      Sources of Income      What are your sources of income? No  SSD denied    Monthly income:       Is income sufficient to meet monthly expenses?      Assistance received (food bank, SNAP, utility assistance, etc.)- Medicaid;  SNAP    Behavioral Health     Alcohol consumption   Use of street drugs  Use of medicinal marijuana or CBD- CBD- not in last six months     Does patient report a history of prior behavioral health treatment for patient? Yes:    Patient participates in the [facility] behavioral health services. She talks to her therapist virtually every two weeks.       Family history of behavioral health treatment- not assessed     Mood in the last two week.    Varies from day to day-  an attitude/sandoval. Sometimes I forget some of the daily tasks, when my daughter reminds me I  an attitude.    Suicidal Ideation- No    MENTAL STATUS/OBSERVATIONS   Participation: Active verbal participation  Grooming: Casual  Orientation:Alert and Fully Oriented   Behavior: Calm and pleasant  Eye contact: Good   Mood:Happy  Affect:Flexible  Thought process: Logical  Thought content:  Within normal limits  Speech: Rate within normal limits and Volume within normal limits  Perception: Within normal limits  Memory: Recent:  Adequate and Remote:  could not recall some details regarding behavioral health issues. Able to  recall other details, such as work history.  Insight: Adequate  Judgment:  Adequate     Family/couple interaction observations: her son-in-law provided many details of behavioral health crisis and hospitlaization.      What gives meaning to life; what is important  My kids and grand kid- I love life    Plans & Goals  The holidays; another grand child    Social Work Care Plan    • Apply again for Social Security Disability. If the application is denied, go to this web site for details: https://www.ssa.gov/benefits/disability/appeal.html. Also contact [facility] at [address]; [phone].    • Consider becoming more social and engaged in activities. Examples include her [employer] ([email or web address]). Richard ster[employer] and recreation website.    What Matters    During the visit you  shared that you were hoping to address problems with my memory and the reasons for memory problem. You also shared that family and I love living give you meaning in life and that you are looking forward to the holidays and birth of a grand child.      We appreciate your openness and honesty with sharing this personal information. In helping you achieve What Matters we know that happiness, safety, support, companionship, symptom control, adequate sleep, and advanced planning can help many of us achieve What Matters. Below are some resources that we discussed in clinic that we think will help you achieve What Matters, especially some information about advance care planning.

## 2021-11-08 NOTE — PROGRESS NOTES
"Who manages meds? self  Pill box? no  How many times a week do you forget? rarely  How many times a day? 2  Medication cost a concern? none  Beer's meds? n/a  FRID drugs? quetiapine  Concerns: memory  Falls: 6 falls, always falling over something in the house, except passed out in August 2021 when ill.  Dizzy upon standing in the morning when getting up from sleeping. Leg muscles are weak. Foggy brain in the morning. No falls in middle of night.   Meds dosed appropriately for renal and liver fxn? yes  Home BP checks: no  Hx of MI, stroke, peripheral vascular disease? no  Accompanied by: son in law, Danny  OTC Pain Relievers: APAP and ibuprofen - occasionally - HA or muscle ache    Hashimoto's Disease  Levothyroxine    TSH wnl 2/21    Bipolar  Quetiapine - \"bipolar medicine\"  50 mg was too much - felt \"out of body\"-   - Been on a year, reports effective and does not feel drowsy or hung over next day.  - falls asleep within 30 min of laying down  PHQ9: 2  PAULETTE 7: 3    Misc  Omeprazole?? - not taking    Supplements  Vitamin d - reports [place] told her she is low in vit d    Fish Oil, 2 caps = 300 mg omega 3: daughter tells her its good for her. Takes one a day    Adult Multiple vitamin gummy: serving size 2, two daily    Hot Flash (soy, black cohosh, dong quai, licorice, Chaste tree berry) - 3 caps=serving: Hot flashes once a week. Takes 1 cap a day scheduled.      -- No interest in stopping supplements. Provided information about fish oil.       "

## 2021-11-08 NOTE — ASSESSMENT & PLAN NOTE
The patient's cognition does appear to be effecting their ADL or IADL status. This does suggest the presence of a neurocognitive disorder. Delirium, mood, and acute psychiatric conditions were considered in the differential. Patient does have subjective complaints and a physical exam finding that does warrant continued work up. Abnormal mini cog on today's assessment, and PAULETTE 7 and PHQ 9 screenings were not significantly elevated, though may have limited utility in relation to short term memory loss. Patient has not had significant functional/cogntive changes in the last year, and much of her symptomatolgy is related to what appear to be episodes of delirium.     Etiology of memory problems likely related to delirium from her 2018 hospital stay and her delirium included by her antipsychotic medication regimen (when the doses were too high). Psychiatric symptoms appear stable over the last year, but her memory is worsening. We discussed how this worsens when she is anxious and she continues to have good days and bad days, Patient does not clearly have a lewy body dementia as her hallucinations appears secondary to life events, rather than continous, as she has not had any within the last year, which is atypical of Lewy Body. Her parkinsonian symptoms are thought to be secondary to the use of her antipsychotic medications, rather than primary parkinsonian syndrome. MRI of the brain in 2018 did not suggest vascular lesion that could be contributing. There is not clear findings on exam to suggest repeating MRI of the brain to be of high utility (ie finding a new stroke or other central pathology that would ). Referral paperwork showed CMP, lipid panel, A1c, TSH.  Of note her lipid panel showed elevated cholesterol. These labs were from 8/2021.    Recommendations.   -Given some of the observational signs by the family I feel further treatment of depression would be helpful with her memory and for  consideration of an adjunctive SSRI by psychaitry. SSRI could worsen bipolar symptoms, however patient is not clearly bipolar, based on history provided it is not clear exactly what her mental health dx is and it has continued to evolve as her symptomatology as evolved. She may also be stable if dual treated with SSRI and antipsychotic. Continue counseling as patient notes mindfulness therapy has been very helpful.  -patient reports stability on current dose of quetiapine, discussed memory related risks of this medication, which she experienced on higher doses, but discussed how benefit of mood/symptom stability may warrant continued use, as prescribed by psychiatry.   -If not done recently would recommend a B12 level and CBC to complete the lab work up for dementia. Her chart also has a negative HIV and RPR.   -further work up would depend on any changes with treatment above and.or progression of her symptomatolgy   -continue treatment and monitoring of her hypothyroidism  -MIND diet and exercise for prevention   patient deferred discussion about prognostication regarding her memory .  -f/u 3-6 months

## 2021-11-08 NOTE — PROGRESS NOTES
Interdisciplinary Comprehensive Geriatric Assessment (CGA) - [facility]    Brief Overview of assessment:    Our comprehensive geriatric assessment (CGA) team is comprised of a medical assistant (MA), medical provider, pharmacist, and , all of whom create a note during the assessment. The patient's assessment starts with the MA who screens the patient for many common geriatric syndromes. If possible, the MA does these assessments with the patient alone. The MA then introduces the patient and (s) to the rest of the CGA team and shares information collected during the screening assessments. The CGA team then completes the assessment and provides recommendations over the next 90 minutes.  We provide recommendations modeling that of an Age-Friendly Health System with the 4 Ms of Care (What Matters, Mentation, Medications, and Mobility). For any questions about our assessment or recommendations, please reach out to our office ([phone]). To learn more about providing age friendly care to your patients consider visiting this web site to learn more. http://www.ihi.org/Engage/Initiatives/Age-Friendly-Health-Systems/Pages/default.    In general, we encourage patients to follow-up with their primary care provider prior to implementing the recommendations (orders, referrals, med changes, etc) discussed during the visit today. See A/P and patient instructions below.      Visit Note:  Chief Complaint   Patient presents with   • Health Risk Assessments For Seniors     [facility]iatric Assessment      Patient Name: Vilma Foster  Patient Age; 56 y.o.  Date of Consult: 11/8/2021  Referring Provider: Referred by Ella diallo  for memory loss  PCP: Not listed.     Interdisciplinary Geriatric Consult Provided By:   Michael Giles M.D.  Sydney Overton, Pharm D  Colleen Bartholomew, PhD, [facility]    Assessment and Plan:   Neuroleptic-induced Parkinsonism (HCC)  Patient with some  mild features on exam; but could not comply on exam;  on quetiapine 25 mg nightly and appears stable. Quetiapine is being used to treat bipolar, though the patient appears to have had evolving diagnosis. It is my understanding that this medication may be the least likely of antipsychotics to result in EPS.     Memory loss  The patient's cognition does appear to be effecting their ADL or IADL status. This does suggest the presence of a neurocognitive disorder. Delirium, mood, and acute psychiatric conditions were considered in the differential. Patient does have subjective complaints and a physical exam finding that does warrant continued work up. Abnormal mini cog on today's assessment, and PAULETTE 7 and PHQ 9 screenings were not significantly elevated, though may have limited utility in relation to short term memory loss. Patient has not had significant functional/cogntive changes in the last year, and much of her symptomatolgy is related to what appear to be episodes of delirium.     Etiology of memory problems likely related to delirium from her 2018 hospital stay and her delirium included by her antipsychotic medication regimen (when the doses were too high). Psychiatric symptoms appear stable over the last year, but her memory is worsening. We discussed how this worsens when she is anxious and she continues to have good days and bad days, Patient does not clearly have a lewy body dementia as her hallucinations appears secondary to life events, rather than continous, as she has not had any within the last year, which is atypical of Lewy Body. Her parkinsonian symptoms are thought to be secondary to the use of her antipsychotic medications, rather than primary parkinsonian syndrome. MRI of the brain in 2018 did not suggest vascular lesion that could be contributing. There is not clear findings on exam to suggest repeating MRI of the brain to be of high utility (ie finding a new stroke or other central pathology that  would ). Referral paperwork showed CMP, lipid panel, A1c, TSH.  Of note her lipid panel showed elevated cholesterol. These labs were from 8/2021.    Recommendations.   -Given some of the observational signs by the family I feel further treatment of depression would be helpful with her memory and for consideration of an adjunctive SSRI by psychaitry. SSRI could worsen bipolar symptoms, however patient is not clearly bipolar, based on history provided it is not clear exactly what her mental health dx is and it has continued to evolve as her symptomatology as evolved. She may also be stable if dual treated with SSRI and antipsychotic. Continue counseling as patient notes mindfulness therapy has been very helpful.  -patient reports stability on current dose of quetiapine, discussed memory related risks of this medication, which she experienced on higher doses, but discussed how benefit of mood/symptom stability may warrant continued use, as prescribed by psychiatry.   -If not done recently would recommend a B12 level and CBC to complete the lab work up for dementia. Her chart also has a negative HIV and RPR.   -further work up would depend on any changes with treatment above and.or progression of her symptomatolgy   -continue treatment and monitoring of her hypothyroidism  -MIND diet and exercise for prevention   patient deferred discussion about prognostication regarding her memory .  -f/u 3-6 months       Poor dentition  Not actively seeing a dentist; has dry mouth and halitosis on exam. Encourage oral hygiene (see handout)  encourage visit to the dentist.     Patient Instructions     We thank you for taking the time to share during your medical visit with our team of providers at the [facility]. During the medical visit we completed a Comprehensive Geriatric Assessment with a , pharmacist, and physician, all specialty trained in Geriatrics.     Below are our Age  Friendly recommendations. Our recommendations are shaped using the 4 M’s model of care. In using the 4 M’s we approach care from starting with What Matters most to you.  We then use Mobility, Medications and Mentation to support that. Please note, our recommendations are another point on your health care journey. We hope the time we spent together helps you achieve What Matters most to you.    What Matters    During the visit you shared that you were hoping to address problems with my memory and the reasons for memory problem. You also shared that family and I love living give you meaning in life and that you are looking forward to the holidays and birth of a grand child.    We appreciate your openness and honesty with sharing this personal information. In helping you achieve What Matters we know that happiness, safety, support, companionship, symptom control, adequate sleep, and advanced planning can help many of us achieve What Matters. Below are some resources that we discussed in clinic that we think will help you achieve What Matters, especially some information about advance care planning.     Mentation    During your assessment we felt that your mood may be a playing a role in your health. To better address your mood, we recommend a variety of medical, pharmacological, and behavioral health approaches to treatment.     Medical: Sometimes depression or anxiety can be secondary to medical conditions. You have a diagnosis of bipolar disorder, and it seems that you may still have significant depressive/anxiety symptoms. Talk to your psychiatrist to see if any medications (SSRIs) could be helpful in smoothing out your bad days. SSRIs can make bipolar worse, so make sure you discuss this with your psychiatrist.     Pharmacological: Sometimes medications can be a helpful tool in dealing with our mood. Ask you psychiatrist if SSRI type medications might help your mod.     Behavioral Health: When we think about our  behavior, we think of both of our cognitive and physical behaviors. Sometimes working through some of our thoughts and/or emotions can be helpful. We recommend you continue seeing a counselor/therapist to discuss some of these issues. Physical activity can help us improve our mood. We recommend you be active to help you achieve What Matters most to you.     As you work through your treatment plan, please make sure to keep active and doing those things that give you meaning in life. Thinking, memory, mood, and mental health matter! Just like your body changes with age, so can your brain. Ways to support mentation include being engaged in meaningful activities and managing stress and anxiety. Trying new ways to relax and connect may be helpful.    During your assessment, we noted that your cognition is affecting your ability to function in daily life. Support from family and friends can be very important as you continue this part of your health care journey. Below are the specific recommendations we discussed during your visit. Please continue to follow up with your provider about any concerns or sudden changes that you or your family note.       Medications  During our assessment, we reviewed your medications to make sure they are supporting What Matters most you. Based on our discussion we thought there may be opportunity to adjust some medications to help you better achieve your health goals.    · Fish oil contains omega 3 fatty acids and may be taken for many different conditions. Lowering triglycerides and blood pressure, heart health and reducing stiffness and pain from rheumatoid arthritis are common reasons. There does not appear to be any benefit to taking fish oil supplements for reducing the pain and stiffness from osteoarthritis; however, it seems to decrease the duration of morning stiffness and tender joints in people with rheumatoid arthritis.  There is limited information that fish oil can improve  cognitive function, but the majority of clinical research shows that fish oil supplementation does NOT improve cognitive function in healthy adults or in people with cognitive decline.   · Research has shown that omega 3 fatty acids from food are beneficial to the heart of healthy people and those who have cardiovascular disease. Eating fish twice weekly has been shown to reduce total cholesterol, triglycerides, LDL and increase HDL. The American Heart Association recommends eating at least 2 servings of fatty fish a week. Fatty fish that are high in omega 3 fatty acids include: salmon, mackerel, herring, lake trout, sardines and albacore tuna. The evidence regarding the effect of fish oil supplements on lipid levels is conflicting; however most recent studies do not show a benefit.  · Since the evidence for benefits of fish oil supplements on cardiovascular health are conflicting and most newer studies do not show a benefit, rather than taking a supplement, I recommend you consider getting omega 3 fatty acids from your diet and discontinue the supplement. See additional handout.   · See handout on dietary supplements.    Mobility   During our assessment we were happy to see how active you are! We encourage you to keep this up. At the [facility] we focus on how mobility can help you achieve What Matter’s. In general, we recommend all adults be active every day, to the best of their ability.   We recommend about 150 minutes of moderate exercise per week, about 25 minutes per day. Choose any activity that you enjoy doing. You should slowly work up to this goal.     Other Recommendations  Medical Recommendations:  • I think your 2018 hospital stay and subsequent journey, as well as your medication history is playing a role in your memory. As above, treating you mood with another medication could also be helpful. It sounds like things have been better the last year, but might be gradually getting worse. When we  follow up in six months to see if further neuropsychological testing would benefit you, based on any changes you note from now to then.   • Not being able to hear can also look like memory loss. Consider getting your hearing checked to see if hearing aids are an option for you.   • See the handouts on the exercise and diet to look at things that can prevent memory loss.     Social Work Care Plan    • Apply again for Social Security Disability. If the application is denied, go to this web site for details: https://www.ssa.gov/benefits/disability/appeal.html. Also contact [facility] Legal Services at [place]; [phone].    • Consider becoming more social and engaged in activities. Examples include [employer] (https://med.[email or web address].edu/olli/). [place] and recreation website.      List of Handouts:  • CBD, Dementia care partners , Falls prevention, [facility], Supplements , Fish oil supplements , MIND Diet, [employer] ([employer]) , Dementia-Friendly Nature Walks and JAVA Music     Referral Recommendations (to be ordered by your primary care provider)  • None    To follow up with our recommendation (orders, referrals, med changes, etc) we encourage you to follow with their primary care provider before implementing these changes.      History of Present Illness:   Patient's Primary Language: English   Patient's Care Partner(s) Name: Danny   Care Partners(s) Relationship to Patient: Son in law  History provided by:Patient and Care partner  Patient is a fair historian; Danny provides history surrounding initial hospitalization, but patietn can also recall.     56 y.o. female with PMH of depression, hearing loss, hypercholesterolemia, hypothyroidism with +TPO ab titer, and psychosis whose dx has changes many times, but currently has a working dx of bipolar d/o. Is coming for a comprehensive geriatric assessment. Patient was referred by her therapist for  "concerns about memory. Currently lives with her daughter Nia. Patient appreciates her family children, and grandchildren. Looking forward to more family affairs going forward.     Upon reviewing the chart and confirming with patient and son in law (JESSICA), it appears that patient had an admission in 2018 after she had changes in mentation. Essentially, whe was admitted to the hospital with suspected encephalitis. 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 In asking the patient to review her initial symptoms it seems that the patient first noted tinnitus and hearing loss IN 2018, the patient also endorses worsening insomnia over the few months before her hospital stay. She notes that was set off the need to go to the hospital was shortly after the death of family member. At that time she thought her grandchild had the head of a  relative and was starting to experience auditory and visual hallucinations. Her JESSICA notes that he first noted the masked face, and forgetfulness as being prominent symptoms at that time. He also notes that she appeared to get worse after admission to the hospital and he describes symptoms including echolalia, echopraxia, abnormal body movements (drawing something \"trex like\" and repetitive), and staring spells. Durig this hospital stay she was treated with antiepleptics, antipsychotics and steroids. She ended up improving on this regimen, and was transferred to the [facility] for further work up.  Work up in [place] was negative, including general labs including HIV, Hep Vickey well as CSF studies ended up being unremarkable. CSF was negative for pleocysotis or other signs of inflmattion, +CODY ab titer at 1:80 and +TPO antibioties, MRI of the brain w/ and w/o contrast was unremarkable as were two EEG recordings. Per notes, it appears [place] believed the patient had a primary psychiatric d/o, but family does not exactly remember what the diagnosis was. " "(catatonic schizophrenia?). She followed up with [facility] primary care twice in the fall of 2018 and during the last visit it was noted she stopped the risperidone, under the supervision of her psychiatrist. The next thing noted is that she had a recurrence of her symptoms (auditory and visual hallucinations) in November 2018 and again presented to the ED. Not sure what the reason for this admission was or what the trigger is. Usually the masked facies, memory loss, and abnormal muscle movements as being the main symptoms.    The patient was seen by Dr. Gonzales of neurology in January 2019. His impression was that she may have had a primary psychiatric condition, rather than a neurological one. He also noted that she may have drug induced parkinsonian symptoms.     Patient also presented to the ED with \"schizophrenia breakdown\" after non adherence to her medication regimen on 6/30/2019. This was again after another death in the family. Sent to [place] and started on abilify.  She was transferred to [place] for treatment. At that time they diagnosed her bipolar d/o. She was rx abilify at that time and it had somnolence (memory, \"could not think, could not function\") as a side effect; She was then switched to quetiapine 50 mg  And then decreased the dose to 25 mg and she has been on this dose for the las year and she notes this has been a good dose for her.     Of note, referring notes suggests both schizophrenia and bipolar haven been ruled out, though patient notes she has an active dx of Bipolar d/o.     Currently seeing therapy at [facility], they are currently working on mindfulness.     Can get defensive when she forgets things and gets reminded of things. She notes that she gets frustrated as a reason for her defensiveness.      She often has hallucinations when she deals with a stressful life event such as adeath in the family.     She is walking daily about 15 min to two miles.  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     Patient and JESSICA note, no signifanct changes in ADL or IADL status the last ten years, but she is having more issues with remember how to do multi step tasks and this is gradually getting worse the last year.      Review of System:   ROS   Hearing - trouble hearing bilaterally; started April 2018; has not see hearing doctor  Vision  - fair, last eye exam feb 2021; does have cataracts.   Appetite - good; her daughter monitors when she eats  Weight Loss - none; gained about 6 lbs in last year.   Dysphagia - none  Fatigue - a little of the time  Sleep - sleep is okay. Nocturia once- twice per night; feels like a \"zombie\" when she wakes in the AM; she also feels stiff upon waking.   Dental Problems - teeth are bad, not painful, not seeing a dentist  Constipation - none  Diarrhea - none  Urinary Incontinence - has urgency 3-4 times per day; had UI once when the bathroom was far. dneies effecting quality of life.   Any falls within the last year? 6; usually trips over things; usually falls in the house; she had a big fall when she had a 14 day viral illness in August;   Any injuries associated with the falls? Yes   -If yes, what injury? Twisted foot - this occurred the last time she fell when getting up at night;   Do you worry about falling? No   Do you feel unsteady when standing or walking? No   You have symptoms of lightheadedness or dizziness from lying to standing? Yes; after getting up from sleep;   Mood - changes day to day; overall good; she loves life.   Any personal history of depression/anxiety/psychiatric disorder - yes see above    Cognitive ROS  Memory concerns - started with initail presentation in SPring/Summer 2018 as noted above. OWrse when she has halluciations. Her memory is worse when she is not on a specific schedule or something is changed. She needs to be retaught things; JESSICA feels " that overall things are getting worse. Mood can play a role with worsening memroy.      Carol has good days and bad days;   Advance Directives - has completed healthcare dPOA  History of TBI - none  Hallucinations - none in last year  Tremor - none noted.   Rigidity - in morning when waking  Gait Changes - only in the morning; usually on meds they were worse   Behavior/personality changes - mentally slower; used to be more outgoing. Does not have in depth conversation;   Alcohol use - not currently using; used more ETOH in [id number], a few drinks per day;   History of PVD/MI/Stroke - denies  FH of dementia - none, does have FH of schizophrenia.   Previous labs - reviwed CBC, CMP, TSH, HIV and RPR  Previous Head Imaging. - head MRI w and w/o in 2018 during initial psychiatric episode.     IADL status: no changes in ten years overall, as had been codependency since moving in with her daughter in 2011; she does have some significant changes in memory and there is concern that she may have had changes in IADL status if she had remained independent; she is also have trouble remembering previously learned tasks, not assessed by the IADL's listed below   IADL  Legend:   1- independent  2 - need assistance  3 - unable to complete   Are you still driving? No      Are you able to prepare your own meals and/or cook, need assistance or unable to complete? 1     Are you able to do shopping and errands, need assistance or unable to complete? 2     Are you able to do housekeeping, chores, need assistance or unable to complete? 1     Are you able to do laundry, need assistance or unable to complete? 2     Are you able to manage your medications, need assistance or unable to complete? 1     Are you able to do your own money management, need assistance or unable to complete? 3     Are you able to use the phone, need assistance or unable to use the phone? 1     ADL     Are you independent, need assistance, or unable to bathe  yourself? 1     Are you independent, need assistance, or unable to dress yourself? 1     Are you independent, need assistance, or unable to feed yourself? 1     Are you independent, need assistance, or unable to get up out of a chair or off a bed? 1     Are you independent, need assistance, or unable to use the toilet by yourself?1     Are you aware when you need to use the toilet? Yes      If no, please describe the problem you are experiencing.     I reviewed the patient's MiniCog, PHQ9, GAD7, falls, and frailty screens as documented in the Medical Assistant's note.   Mini Cog - 3/5; 2/3 memory recall  clock with normal numbers and arms, but hands of clock not centered.   PHQ 9 score - 2  PAULETTE 7 - 3  FRAIL Scale Score: 0/5    Social History     Socioeconomic History   • Marital status:      Spouse name: Not on file   • Number of children: 3   • Years of education: Not on file   • Highest education level: 11th grade   Occupational History   • Not on file   Tobacco Use   • Smoking status: Never Smoker   • Smokeless tobacco: Never Used   Vaping Use   • Vaping Use: Never used   Substance and Sexual Activity   • Alcohol use: Not Currently     Comment: Not since 2011- a couple of beers   • Drug use: Never   • Sexual activity: Not on file   Other Topics Concern   • Not on file   Social History Narrative   • Not on file     Social Determinants of Health     Financial Resource Strain:    • Difficulty of Paying Living Expenses: Not on file   Food Insecurity:    • Worried About Running Out of Food in the Last Year: Not on file   • Ran Out of Food in the Last Year: Not on file   Transportation Needs:    • Lack of Transportation (Medical): Not on file   • Lack of Transportation (Non-Medical): Not on file   Physical Activity: Insufficiently Active   • Days of Exercise per Week: 7 days   • Minutes of Exercise per Session: 20 min   Stress:    • Feeling of Stress : Not on file   Social Connections:    • Frequency of  "Communication with Friends and Family: Not on file   • Frequency of Social Gatherings with Friends and Family: Not on file   • Attends Yazidi Services: Not on file   • Active Member of Clubs or Organizations: Not on file   • Attends Club or Organization Meetings: Not on file   • Marital Status: Not on file   Intimate Partner Violence:    • Fear of Current or Ex-Partner: Not on file   • Emotionally Abused: Not on file   • Physically Abused: Not on file   • Sexually Abused: Not on file   Housing Stability:    • Unable to Pay for Housing in the Last Year: Not on file   • Number of Places Lived in the Last Year: Not on file   • Unstable Housing in the Last Year: Not on file       Family History   Problem Relation Age of Onset   • Lung Disease Mother    • Psychiatric Illness Mother    • Thyroid Daughter      Current Outpatient Medications on File Prior to Visit   Medication Sig Dispense Refill   • Multiple Vitamin (MULTIVITAMIN ADULT PO) Take 2 Each by mouth every day.     • Cholecalciferol (VITAMIN D) 50 MCG (2000 UT) Cap Take 1,000 Units by mouth every day.     • Omega-3 Fatty Acids (FISH OIL) 600 MG Cap Take 300 mg by mouth every day.     • QUEtiapine (SEROQUEL) 25 MG Tab Take 25 mg by mouth at bedtime.     • levothyroxine (SYNTHROID) 75 MCG Tab Take 75 mcg by mouth Every morning on an empty stomach.       No current facility-administered medications on file prior to visit.       Physical Exam:   /84 (BP Location: Left arm, Patient Position: Sitting, BP Cuff Size: Adult)   Pulse 77   Temp 37.2 °C (99 °F)   Ht 1.499 m (4' 11\")   Wt 71.6 kg (157 lb 12.8 oz)   LMP 07/02/2011 (Approximate)   SpO2 98%   BMI 31.87 kg/m²   Physical Exam  HENT:      Head: Normocephalic and atraumatic.      Mouth/Throat:      Mouth: Mucous membranes are dry.   Cardiovascular:      Rate and Rhythm: Normal rate and regular rhythm.      Pulses: Normal pulses.      Heart sounds: Murmur heard.       Pulmonary:      Effort: Pulmonary " effort is normal.      Breath sounds: Normal breath sounds.   Musculoskeletal:      Right hand: Normal.      Left hand: Normal.      Cervical back: Normal range of motion. Tenderness (near C5 left paraspinal area. ) present. No rigidity.   Neurological:      Mental Status: She is alert.      Cranial Nerves: Cranial nerves are intact. No cranial nerve deficit.      Sensory: Sensation is intact.      Motor: Motor function is intact. No tremor. Abnormal muscle tone: patient could not adhere to rigidity testing.       Coordination: Coordination is intact. Romberg sign negative. Coordination normal. Finger-Nose-Finger Test and Heel to Shin Test normal. Rapid alternating movements normal.      Gait: Gait (slighly short based gait.) normal.      Comments: Notes some paresthesias to left upper arm.    Psychiatric:         Attention and Perception: Attention normal. She is attentive.         Mood and Affect: Mood normal.         Behavior: Behavior normal.     Labs;  CBC:  Lab Results   Component Value Date/Time    WBC 9.3 06/30/2019 01:24 AM    RBC 4.75 06/30/2019 01:24 AM    HEMOGLOBIN 13.7 06/30/2019 01:24 AM    HEMATOCRIT 41.5 06/30/2019 01:24 AM    MCV 87.4 06/30/2019 01:24 AM    MCH 28.8 06/30/2019 01:24 AM    MCHC 33.0 (L) 06/30/2019 01:24 AM    MPV 10.2 06/30/2019 01:24 AM    NEUTSPOLYS 64.60 06/30/2019 01:24 AM    LYMPHOCYTES 27.30 06/30/2019 01:24 AM    MONOCYTES 6.50 06/30/2019 01:24 AM    EOSINOPHILS 0.80 06/30/2019 01:24 AM    BASOPHILS 0.50 06/30/2019 01:24 AM      BMP:   Lab Results   Component Value Date/Time    SODIUM 144 06/30/2019 01:24 AM    POTASSIUM 4.6 06/30/2019 01:24 AM    CHLORIDE 107 06/30/2019 01:24 AM    CO2 26 06/30/2019 01:24 AM    GLUCOSE 187 (H) 06/30/2019 01:24 AM    BUN 16 06/30/2019 01:24 AM    CREATININE 1.09 06/30/2019 01:24 AM      LFT:   Lab Results   Component Value Date/Time    ASTSGOT 21 06/30/2019 01:24 AM    ALTSGPT 15 06/30/2019 01:24 AM    TBILIRUBIN 0.3 06/30/2019 01:24 AM     ALBUMIN 3.7 06/30/2019 01:24 AM    TOTPROTEIN 7.0 06/30/2019 01:24 AM    ALKPHOSPHAT 89 06/30/2019 01:24 AM      Calcium:   Lab Results   Component Value Date/Time    CALCIUM 9.3 06/30/2019 01:24 AM     VIT D: No results found for: 25HYDROXY  TSH:   Lab Results   Component Value Date/Time    TSHULTRASEN 8.650 (H) 06/30/2019 0124     THYROXINE (T4):   Lab Results   Component Value Date/Time    FREET4 0.92 07/02/2018 0449     A1c:   Lab Results   Component Value Date/Time    HBA1C 5.9 (H) 07/12/2018 0557    AVGLUC 123 07/12/2018 0557     Lipids:   Lab Results   Component Value Date/Time    CHOLSTRLTOT 207 (H) 07/01/2018 08:13 PM    TRIGLYCERIDE 118 07/01/2018 08:13 PM    HDL 44 07/01/2018 08:13 PM     (H) 07/01/2018 08:13 PM     Still need B12, HIV, RPR    Imaging:  Results for orders placed during the hospital encounter of 11/27/18    CT-HEAD W/O    Impression  1.  No acute intracranial abnormality.    Note: Examination is mildly limited due to persistent motion artifacts despite 3 acquisition attempts.      Results for orders placed during the hospital encounter of 07/03/18    MR-BRAIN-WITH & W/O    Impression  MRI of the brain without and with contrast within normal limits.      My total time spent caring for the patient on the day of the encounter was 90 minutes.   This does not include time spent on separately billable procedures/tests.    This note was partially dictated with voice recognition software, for any confusion please do not hesitate to contact me.

## 2021-11-08 NOTE — NON-PROVIDER
MA CGA Assessment    NAME: Vilma Foster     Patient's Primary Language: English   Patient's Care Partner(s) Name: Danny   Care Partners(s) Relationship to Patient: Son in law       IADL  Legend:   1- independent  2 - need assistance  3 - unable to complete     Are you still driving? No     Are you able to prepare your own meals and/or cook, need assistance or unable to complete? 1    Are you able to do shopping and errands, need assistance or unable to complete? 2    Are you able to do housekeeping, chores, need assistance or unable to complete? 1    Are you able to do laundry, need assistance or unable to complete? 2    Are you able to manage your medications, need assistance or unable to complete? 1    Are you able to do your own money management, need assistance or unable to complete? 3    Are you able to use the phone, need assistance or unable to use the phone? 1    ADL    Are you independent, need assistance, or unable to bathe yourself? 1    Are you independent, need assistance, or unable to dress yourself? 1    Are you independent, need assistance, or unable to feed yourself? 1    Are you independent, need assistance, or unable to get up out of a chair or off a bed? 1    Are you independent, need assistance, or unable to use the toilet by yourself?1    Are you aware when you need to use the toilet? Yes     If no, please describe the problem you are experiencing.       Assist Devices: Patient uses:  Cane: No   Walker: No   Wheelchair: No   Ostomy: No   Other tubes: No   Amputations: No   Chronic oxygen use: No   CPAP/BIPAP use: No   : Denies any urinary leakage during the last 6 months  If you have a problem with continence, do you wear special garments?  No       Fall Screening:   Any falls within the last year? 6  Any injuries associated with the falls? Yes   -If yes, what injury? Twisted foot   Do you worry about falling? No   Do you feel unsteady when standing or walking? No   You have symptoms of  lightheadedness or dizziness from lying to standing? Yes     Any throw rugs on floor? Yes   Do you have stairs? No   Do you have handrails on all stairs.   Good lighting in all hallways. Yes   Any difficulty hearing? Yes   Wears hearing aids: No   Any difficulty with vision? No   Last eye exam:  2021      FRAIL SCALE    Fatigue:  How much time during the past 4 weeks did you feel tired:  1=All the time, 2=Most the time, 3=Some of the time, 4=A little of the time,  5=None of the time  Answer: 4  (responses of 1 or 2 are scored as 1 and all others as 0)  SCORE: 0    Resistance:  By yourself and not using aids, do you have any difficulty walking up 10 steps without resting?  1=Yes, 0=No  SCORE:  1    Ambulation:  By yourself and not using aids, do you have any difficulty walking a couple of blocks?  1=Yes, 0=No  SCORE: 0    Illnesses: Did a doctor ever tell you that you have: (illness)?  How many (see list)    Hypertension: No   Diabetes: No   Cancer (other than minor skin cancer): No   Chronic Lung Disease: No   Heart attack: No   Congestive Heart Failure: No   Angina: No   Asthma: No   Arthritis: No  Stroke: No   Kidney Disease: No     The total illnesses (0-11) are recorded as 0-4 = 0 and 5-11 = 1  Total Illness Score: 0    SCORE: 0    Loss of Weight over last year:   If noted above, one year ago, how much did you weigh:?  152 Up 5.8lb   (percent change is computed as: weight 1 year ago- current weight/weight 1 year ago. X 100.  (more than 5% weight loss is scored as 1, and less than 5% is 0)    Score: 0    Total Score: 0    Scorin =  Robust Health  1-2=Pre-frail  3-5=Frail    Ask if they have been admitted to the hospital in the past three month:  No       MiniCog:   Words asked: Banana Sunrise Chair     1/2; 2/3 for memory recall  Total score: 3/5           Depression Screen - PHQ- 9   0 = Not at all, 1 = Several days,  2 = More than half the days,  3 = Nearly every day     Little interest or pleasure in  doing things? 0  Feeling down, depressed , or hopeless? 0  Trouble falling or staying asleep, or sleeping too much?  0  Feeling tired or having little energy? 1  Poor appetite or overeating?  0  Feeling bad about yourself - or that you are a failure or have let yourself or your family down?  0  Trouble concentrating on things, such as reading the newspaper or watching television? 0  Moving or speaking so slowly that other people could have noticed.  Or the opposite - being so fidgety or restless that you have been moving around a lot more than usual? 1  Thoughts that you would be better off dead, or of hurting yourself? 0  Patient Health Questionnaire Score:  2    Anxiety Screen/PAULETTE-7 Questionnaire  0 = Not at all, 1 = Several days,  2 = More than half the days,  3 = Nearly every day     Feeling nervous, anxious, or on edge:1  Not being able to sop or control worryin  Worrying too much about different things: 0  Trouble relaxin  Being so restless that it's hard to sit still: 1  Becoming easily annoyed or irritable: 1  Feeling afraid as if something awful might happen: 0  Total: 3    Interpretation of PAULETTE 7 Total Score   Score Severity :  0-4 No Anxiety   5-9 Mild Anxiety  10-14 Moderate Anxiety  15-21 Severe Anxiety      I

## 2021-11-08 NOTE — PATIENT INSTRUCTIONS
We thank you for taking the time to share during your medical visit with our team of providers at the [facility]. During the medical visit we completed a Comprehensive Geriatric Assessment with a , pharmacist, and physician, all specialty trained in Geriatrics.     Below are our Age Friendly recommendations. Our recommendations are shaped using the 4 M’s model of care. In using the 4 M’s we approach care from starting with What Matters most to you.  We then use Mobility, Medications and Mentation to support that. Please note, our recommendations are another point on your health care journey. We hope the time we spent together helps you achieve What Matters most to you.    What Matters    During the visit you shared that you were hoping to address problems with my memory and the reasons for memory problem. You also shared that family and I love living give you meaning in life and that you are looking forward to the holidays and birth of a grand child.    We appreciate your openness and honesty with sharing this personal information. In helping you achieve What Matters we know that happiness, safety, support, companionship, symptom control, adequate sleep, and advanced planning can help many of us achieve What Matters. Below are some resources that we discussed in clinic that we think will help you achieve What Matters, especially some information about advance care planning.     Mentation    During your assessment we felt that your mood may be a playing a role in your health. To better address your mood, we recommend a variety of medical, pharmacological, and behavioral health approaches to treatment.     Medical: Sometimes depression or anxiety can be secondary to medical conditions. You have a diagnosis of bipolar disorder, and it seems that you may still have significant depressive/anxiety symptoms. Talk to your psychiatrist to see if any medications (SSRIs) could be helpful in  smoothing out your bad days. SSRIs can make bipolar worse, so make sure you discuss this with your psychiatrist.     Pharmacological: Sometimes medications can be a helpful tool in dealing with our mood. Ask you psychiatrist if SSRI type medications might help your mod.     Behavioral Health: When we think about our behavior, we think of both of our cognitive and physical behaviors. Sometimes working through some of our thoughts and/or emotions can be helpful. We recommend you continue seeing a counselor/therapist to discuss some of these issues. Physical activity can help us improve our mood. We recommend you be active to help you achieve What Matters most to you.     As you work through your treatment plan, please make sure to keep active and doing those things that give you meaning in life. Thinking, memory, mood, and mental health matter! Just like your body changes with age, so can your brain. Ways to support mentation include being engaged in meaningful activities and managing stress and anxiety. Trying new ways to relax and connect may be helpful.    During your assessment, we noted that your cognition is affecting your ability to function in daily life. Support from family and friends can be very important as you continue this part of your health care journey. Below are the specific recommendations we discussed during your visit. Please continue to follow up with your provider about any concerns or sudden changes that you or your family note.       Medications  During our assessment, we reviewed your medications to make sure they are supporting What Matters most you. Based on our discussion we thought there may be opportunity to adjust some medications to help you better achieve your health goals.    · Fish oil contains omega 3 fatty acids and may be taken for many different conditions. Lowering triglycerides and blood pressure, heart health and reducing stiffness and pain from rheumatoid arthritis are  common reasons. There does not appear to be any benefit to taking fish oil supplements for reducing the pain and stiffness from osteoarthritis; however, it seems to decrease the duration of morning stiffness and tender joints in people with rheumatoid arthritis.  There is limited information that fish oil can improve cognitive function, but the majority of clinical research shows that fish oil supplementation does NOT improve cognitive function in healthy adults or in people with cognitive decline.   · Research has shown that omega 3 fatty acids from food are beneficial to the heart of healthy people and those who have cardiovascular disease. Eating fish twice weekly has been shown to reduce total cholesterol, triglycerides, LDL and increase HDL. The American Heart Association recommends eating at least 2 servings of fatty fish a week. Fatty fish that are high in omega 3 fatty acids include: salmon, mackerel, herring, lake trout, sardines and albacore tuna. The evidence regarding the effect of fish oil supplements on lipid levels is conflicting; however most recent studies do not show a benefit.  · Since the evidence for benefits of fish oil supplements on cardiovascular health are conflicting and most newer studies do not show a benefit, rather than taking a supplement, I recommend you consider getting omega 3 fatty acids from your diet and discontinue the supplement. See additional handout.     · See handout on dietary supplements.    Mobility   During our assessment we were happy to see how active you are! We encourage you to keep this up. At the [facility] we focus on how mobility can help you achieve What Matter’s. In general, we recommend all adults be active every day, to the best of their ability.   We recommend about 150 minutes of moderate exercise per week, about 25 minutes per day. Choose any activity that you enjoy doing. You should slowly work up to this goal.     Other Recommendations  Medical  Recommendations:  • I think your 2018 hospital stay and subsequent journey, as well as your medication history is playing a role in your memory. As above, treating you mood with another medication could also be helpful. It sounds like things have been better the last year, but might be gradually getting worse. When we follow up in six months to see if further neuropsychological testing would benefit you, based on any changes you note from now to then.   • Not being able to hear can also look like memory loss. Consider getting your hearing checked to see if hearing aids are an option for you.   • See the handouts on the exercise and diet to look at things that can prevent memory loss.     Social Work Care Plan    • Apply again for Social Security Disability. If the application is denied, go to this web site for details: https://www.ssa.gov/benefits/disability/appeal.html. Also contact [employer] at [address]; [phone].    • Consider becoming more social and engaged in activities. Examples include [employer] [place] ([email or web address]). [employer] and recreation website.      List of Handouts:  • CBD, Dementia care partners , Falls prevention, [place] [facility], Supplements , Fish oil supplements , MIND Diet, [employer] [place] ([employer]) , Dementia-Friendly Nature Walks and JAVA Music     Referral Recommendations (to be ordered by your primary care provider)  • None    To follow up with our recommendation (orders, referrals, med changes, etc) we encourage you to follow with their primary care provider before implementing these changes.

## 2021-11-08 NOTE — ASSESSMENT & PLAN NOTE
Patient with some mild features on exam; but could not comply on exam;  on quetiapine 25 mg nightly and appears stable. Quetiapine is being used to treat bipolar, though the patient appears to have had evolving diagnosis. It is my understanding that this medication may be the least likely of antipsychotics to result in EPS.

## 2021-11-10 PROBLEM — K08.9 POOR DENTITION: Status: ACTIVE | Noted: 2021-11-10

## 2021-11-10 NOTE — ASSESSMENT & PLAN NOTE
Not actively seeing a dentist; has dry mouth and halitosis on exam. Encourage oral hygiene (see handout)  encourage visit to the dentist.

## 2021-12-13 ENCOUNTER — TELEPHONE (OUTPATIENT)
Dept: INTERNAL MEDICINE | Facility: OTHER | Age: 56
End: 2021-12-13

## 2021-12-13 NOTE — TELEPHONE ENCOUNTER
Call placed to Vilma who reports she really liked the CGA. It took her a step forward which she needed. Has been meeting with Therapist that referred her and they've gone over the recommendations together and are working on them. She did return her survey. Onn reminder list for 6 month f/u.

## 2023-05-16 RX ORDER — ESCITALOPRAM OXALATE 10 MG/1
10 TABLET ORAL DAILY
COMMUNITY

## 2023-05-23 RX ORDER — CHLORAL HYDRATE 500 MG
1000 CAPSULE ORAL DAILY
COMMUNITY

## 2023-05-23 RX ORDER — MELATONIN 10 MG
TABLET, SUBLINGUAL SUBLINGUAL DAILY
COMMUNITY

## 2023-05-24 ENCOUNTER — OFFICE VISIT (OUTPATIENT)
Dept: INTERNAL MEDICINE | Facility: OTHER | Age: 58
End: 2023-05-24
Payer: MEDICAID

## 2023-05-24 VITALS
HEART RATE: 65 BPM | SYSTOLIC BLOOD PRESSURE: 162 MMHG | OXYGEN SATURATION: 95 % | BODY MASS INDEX: 32.58 KG/M2 | RESPIRATION RATE: 12 BRPM | HEIGHT: 59 IN | WEIGHT: 161.6 LBS | TEMPERATURE: 97.8 F | DIASTOLIC BLOOD PRESSURE: 90 MMHG

## 2023-05-24 DIAGNOSIS — E78.5 HYPERLIPIDEMIA, UNSPECIFIED HYPERLIPIDEMIA TYPE: ICD-10-CM

## 2023-05-24 DIAGNOSIS — R41.3 MEMORY LOSS: ICD-10-CM

## 2023-05-24 DIAGNOSIS — H91.92 HEARING LOSS OF LEFT EAR, UNSPECIFIED HEARING LOSS TYPE: ICD-10-CM

## 2023-05-24 DIAGNOSIS — F20.89 PSYCHOSIS, SCHIZOPHRENIA, SIMPLE (HCC): ICD-10-CM

## 2023-05-24 DIAGNOSIS — G47.09 OTHER INSOMNIA: ICD-10-CM

## 2023-05-24 DIAGNOSIS — K08.9 POOR DENTITION: ICD-10-CM

## 2023-05-24 DIAGNOSIS — I10 HYPERTENSION, UNSPECIFIED TYPE: ICD-10-CM

## 2023-05-24 DIAGNOSIS — F41.9 ANXIETY AND DEPRESSION: ICD-10-CM

## 2023-05-24 DIAGNOSIS — F32.A ANXIETY AND DEPRESSION: ICD-10-CM

## 2023-05-24 DIAGNOSIS — E06.3 HASHIMOTO'S DISEASE: ICD-10-CM

## 2023-05-24 DIAGNOSIS — F31.9 BIPOLAR 1 DISORDER (HCC): ICD-10-CM

## 2023-05-24 PROCEDURE — 3077F SYST BP >= 140 MM HG: CPT | Performed by: REGISTERED NURSE

## 2023-05-24 PROCEDURE — 99215 OFFICE O/P EST HI 40 MIN: CPT | Performed by: REGISTERED NURSE

## 2023-05-24 PROCEDURE — 3080F DIAST BP >= 90 MM HG: CPT | Performed by: REGISTERED NURSE

## 2023-05-24 SDOH — SOCIAL STABILITY: SOCIAL NETWORK: ARE YOU MARRIED, WIDOWED, DIVORCED, SEPARATED, NEVER MARRIED, OR LIVING WITH A PARTNER?: DIVORCED

## 2023-05-24 SDOH — HEALTH STABILITY: MENTAL HEALTH: HOW MANY STANDARD DRINKS CONTAINING ALCOHOL DO YOU HAVE ON A TYPICAL DAY?: PATIENT DOES NOT DRINK

## 2023-05-24 SDOH — HEALTH STABILITY: MENTAL HEALTH
STRESS IS WHEN SOMEONE FEELS TENSE, NERVOUS, ANXIOUS, OR CAN'T SLEEP AT NIGHT BECAUSE THEIR MIND IS TROUBLED. HOW STRESSED ARE YOU?: ONLY A LITTLE

## 2023-05-24 SDOH — ECONOMIC STABILITY: TRANSPORTATION INSECURITY
IN THE PAST 12 MONTHS, HAS THE LACK OF TRANSPORTATION KEPT YOU FROM MEDICAL APPOINTMENTS OR FROM GETTING MEDICATIONS?: NO

## 2023-05-24 SDOH — SOCIAL STABILITY: SOCIAL NETWORK: HOW OFTEN DO YOU ATTENT MEETINGS OF THE CLUB OR ORGANIZATION YOU BELONG TO?: NEVER

## 2023-05-24 SDOH — ECONOMIC STABILITY: TRANSPORTATION INSECURITY
IN THE PAST 12 MONTHS, HAS LACK OF TRANSPORTATION KEPT YOU FROM MEETINGS, WORK, OR FROM GETTING THINGS NEEDED FOR DAILY LIVING?: NO

## 2023-05-24 SDOH — ECONOMIC STABILITY: INCOME INSECURITY: IN THE LAST 12 MONTHS, WAS THERE A TIME WHEN YOU WERE NOT ABLE TO PAY THE MORTGAGE OR RENT ON TIME?: NO

## 2023-05-24 SDOH — HEALTH STABILITY: MENTAL HEALTH: HOW OFTEN DO YOU HAVE 6 OR MORE DRINKS ON ONE OCCASION?: NEVER

## 2023-05-24 SDOH — HEALTH STABILITY: MENTAL HEALTH: HOW OFTEN DO YOU HAVE A DRINK CONTAINING ALCOHOL?: NEVER

## 2023-05-24 SDOH — SOCIAL STABILITY: SOCIAL NETWORK
DO YOU BELONG TO ANY CLUBS OR ORGANIZATIONS SUCH AS CHURCH GROUPS UNIONS, FRATERNAL OR ATHLETIC GROUPS, OR SCHOOL GROUPS?: NO

## 2023-05-24 SDOH — ECONOMIC STABILITY: FOOD INSECURITY: WITHIN THE PAST 12 MONTHS, THE FOOD YOU BOUGHT JUST DIDN'T LAST AND YOU DIDN'T HAVE MONEY TO GET MORE.: NEVER TRUE

## 2023-05-24 SDOH — ECONOMIC STABILITY: HOUSING INSECURITY
IN THE LAST 12 MONTHS, WAS THERE A TIME WHEN YOU DID NOT HAVE A STEADY PLACE TO SLEEP OR SLEPT IN A SHELTER (INCLUDING NOW)?: NO

## 2023-05-24 SDOH — ECONOMIC STABILITY: INCOME INSECURITY: HOW HARD IS IT FOR YOU TO PAY FOR THE VERY BASICS LIKE FOOD, HOUSING, MEDICAL CARE, AND HEATING?: SOMEWHAT HARD

## 2023-05-24 SDOH — HEALTH STABILITY: PHYSICAL HEALTH: ON AVERAGE, HOW MANY MINUTES DO YOU ENGAGE IN EXERCISE AT THIS LEVEL?: 10 MIN

## 2023-05-24 SDOH — SOCIAL STABILITY: SOCIAL NETWORK: HOW OFTEN DO YOU GET TOGETHER WITH FRIENDS OR RELATIVES?: THREE TIMES A WEEK

## 2023-05-24 SDOH — ECONOMIC STABILITY: FOOD INSECURITY: WITHIN THE PAST 12 MONTHS, YOU WORRIED THAT YOUR FOOD WOULD RUN OUT BEFORE YOU GOT MONEY TO BUY MORE.: NEVER TRUE

## 2023-05-24 SDOH — SOCIAL STABILITY: SOCIAL NETWORK: IN A TYPICAL WEEK, HOW MANY TIMES DO YOU TALK ON THE PHONE WITH FAMILY, FRIENDS, OR NEIGHBORS?: THREE TIMES A WEEK

## 2023-05-24 ASSESSMENT — ANXIETY QUESTIONNAIRES
6. BECOMING EASILY ANNOYED OR IRRITABLE: SEVERAL DAYS
7. FEELING AFRAID AS IF SOMETHING AWFUL MIGHT HAPPEN: NOT AT ALL
1. FEELING NERVOUS, ANXIOUS, OR ON EDGE: MORE THAN HALF THE DAYS
2. NOT BEING ABLE TO STOP OR CONTROL WORRYING: SEVERAL DAYS
GAD7 TOTAL SCORE: 6
3. WORRYING TOO MUCH ABOUT DIFFERENT THINGS: NOT AT ALL
5. BEING SO RESTLESS THAT IT IS HARD TO SIT STILL: SEVERAL DAYS
IF YOU CHECKED OFF ANY PROBLEMS ON THIS QUESTIONNAIRE, HOW DIFFICULT HAVE THESE PROBLEMS MADE IT FOR YOU TO DO YOUR WORK, TAKE CARE OF THINGS AT HOME, OR GET ALONG WITH OTHER PEOPLE: SOMEWHAT DIFFICULT
4. TROUBLE RELAXING: SEVERAL DAYS

## 2023-05-24 ASSESSMENT — MONTREAL COGNITIVE ASSESSMENT (MOCA)
ORIENTATION SUBSCORE: 6/6
11. FOR EACH PAIR OF WORDS, WHAT CATEGORY DO THEY BELONG TO (OUT OF 2): 2/2
3. DRAW A CLOCK: CONTOUR, NUMBERS, HANDS: 1/3
1. ALTERNATING TRAIL MAKING: 1/1
6. READ LIST OF DIGITS [FORWARD/BACKWARD]: 2/2
9. REPEAT EACH SENTENCE: 1/2
8. SERIAL SUBTRACTION OF 7S: 4 OR 5/5
7. [VIGILENCE] TAP WHEN HEARING DESIGNATED LETTER: 1/1
4. NAME EACH OF THE THREE ANIMALS SHOWN: 3/3
2. COPY DRAWING: 0/1
5. MEMORY TRIALS: FIRST TRIAL;SECOND TRIAL
10. [FLUENCY] NAME WORDS STARTING WITH DESIGNATED LETTER: 0/1
ADD 1 POINT IF LESS THAN OR EQUAL TO 12 YR EDUCATION LEVEL: 0
DELAYED RECALL SUBSCORE: 5/5
WHAT IS THE TOTAL SCORE (OUT OF 30): 25

## 2023-05-24 ASSESSMENT — ENCOUNTER SYMPTOMS
NERVOUS/ANXIOUS: 1
PALPITATIONS: 0
INSOMNIA: 1
WEAKNESS: 0
FEVER: 0
COUGH: 0
TINGLING: 0
MEMORY LOSS: 1
BLURRED VISION: 0
HEMOPTYSIS: 0
HALLUCINATIONS: 1
DEPRESSION: 1
CHILLS: 0
HEADACHES: 0
GENERAL WELL-BEING: FAIR
DIZZINESS: 0
TREMORS: 0
DOUBLE VISION: 0

## 2023-05-24 ASSESSMENT — PATIENT HEALTH QUESTIONNAIRE - PHQ9
SUM OF ALL RESPONSES TO PHQ QUESTIONS 1-9: 10
CLINICAL INTERPRETATION OF PHQ2 SCORE: 2
5. POOR APPETITE OR OVEREATING: 0 - NOT AT ALL

## 2023-05-24 ASSESSMENT — LIFESTYLE VARIABLES
AUDIT-C TOTAL SCORE: 0
SUBSTANCE_ABUSE: 0
SKIP TO QUESTIONS 9-10: 1

## 2023-05-24 ASSESSMENT — ACTIVITIES OF DAILY LIVING (ADL): BATHING_REQUIRES_ASSISTANCE: 0

## 2023-05-24 NOTE — PROGRESS NOTES
MA CGA Assessment    NAME: Vilma Foster     Patient's Primary Language if not English:   Patient's Care Partner(s) Name:   Care Partners(s) Relationship to Patient:      IADL  Legend:   1- independent  2 - need assistance  3 - unable to complete       Are you still driving? No     Are you able to prepare your own meals and/or cook, need assistance or unable to complete? 2    Are you able to do shopping and errands, need assistance or unable to complete? 2    Are you able to do housekeeping, chores, need assistance or unable to complete? 2    Are you able to do laundry, need assistance or unable to complete? 2    Are you able to manage your medications, need assistance or unable to complete? 1    Are you able to do your own money management, need assistance or unable to complete? 2    Are you able to use the phone, need assistance or unable to use the phone? 1    ADL    Are you independent, need assistance, or unable to bathe yourself? 1    Are you independent, need assistance, or unable to dress yourself? 1    Are you independent, need assistance, or unable to feed yourself? 1    Are you independent, need assistance, or unable to get up out of a chair or off a bed? 1    Are you independent, need assistance, or unable to use the toilet by yourself? 1    Are you aware when you need to use the toilet? Yes     If no, please describe the problem you are experiencing.       Assist Devices: Patient uses:  Cane: No   Walker:No   Wheelchair: No   Amputations:No   Chronic oxygen use: No   CPAP/BIPAP use: No   : Denies any urinary leakage during the last 6 months  If you have a problem with continence, do you wear special garments?        Fall Screening:   Any falls within the last year? No   Any injuries associated with the falls?  -If yes, what injury?   Do you worry about falling?Yes   Do you feel unsteady when standing or walking? Yes   You have symptoms of lightheadedness or dizziness from lying to standing? No     Any  throw rugs on floor? No   Do you have stairs?No   Do you have handrails on all stairs?   Good lighting in all hallways. Yes   Any difficulty hearing? Yes   Wears hearing aids: Yes   Any difficulty with vision? Yes   Last eye exam: 2023       FRAIL SCALE    Fatigue:  How much time during the past 4 weeks did you feel tired:  1=All the time, 2=Most the time, 3=Some of the time, 4=A little of the time,  5=None of the time  Answer: 4  (responses of 1 or 2 are scored as 1 and all others as 0)  SCORE: 0    Resistance:  By yourself and not using aids, do you have any difficulty walking up 10 steps without resting?  1=Yes, 0=No  SCORE: 1    Ambulation:  By yourself and not using aids, do you have any difficulty walking a couple of blocks?  1=Yes, 0=No  SCORE: 0      Loss of Weight over last year:   If noted above, one year ago, how much did you weigh:?  158 Up 3 lbs   (percent change is computed as: weight 1 year ago- current weight/weight 1 year ago. X 100.  (more than 5% weight loss is scored as 1, and less than 5% is 0)    Score: 0    Total Score: 1    Scorin =  Robust Health  1-2=Pre-frail  3-5=Frail    Ask if they have been admitted to the hospital in the past three month: No       Mini Cog Clock-  2/2  Time 1:25        Depression Screen - PHQ- 9   0 = Not at all, 1 = Several days,  2 = More than half the days,  3 = Nearly every day     Little interest or pleasure in doing things? 2  Feeling down, depressed , or hopeless? 0  Trouble falling or staying asleep, or sleeping too much? 3   Feeling tired or having little energy? 2  Poor appetite or overeating?  0  Feeling bad about yourself - or that you are a failure or have let yourself or your family down?  0  Trouble concentrating on things, such as reading the newspaper or watching television? 1  Moving or speaking so slowly that other people could have noticed.  Or the opposite - being so fidgety or restless that you have been moving around a lot more than  usual? 2  Thoughts that you would be better off dead, or of hurting yourself? 0  Patient Health Questionnaire Score:  10    Anxiety Screen/PAULETTE-7 Questionnaire  0 = Not at all, 1 = Several days,  2 = More than half the days,  3 = Nearly every day     Feeling nervous, anxious, or on edge: 2  Not being able to stop or control worryin    Worrying too much about different things: 0  Trouble relaxin  Being so restless that it's hard to sit still: 1  Becoming easily annoyed or irritable: 1  Feeling afraid as if something awful might happen: 0  Total Score : 6          Interpretation of PAULETTE 7 Total Score   Score Severity :  0-4 No Anxiety   5-9 Mild Anxiety  10-14 Moderate Anxiety  15-21 Severe Anxiety      I

## 2023-05-24 NOTE — PROGRESS NOTES
Interdisciplinary Comprehensive Geriatric Assessment (CGA) - [facility]    Brief Overview of assessment:    Our comprehensive geriatric assessment (CGA) team is comprised of a medical assistant (MA), medical provider, pharmacist, and , all of whom create a note during the assessment. The patient's assessment starts with the MA who screens the patient for many common geriatric syndromes. If possible, the MA does these assessments with the patient alone. The MA then introduces the patient and (s) to the rest of the CGA team and shares information collected during the screening assessments. The CGA team then completes the assessment and provides recommendations over the next 90 minutes.  We provide recommendations modeling that of an Age-Friendly Health System with the 4 Ms of Care (What Matters, Mentation, Medications, and Mobility). For any questions about our assessment or recommendations, please reach out to our office ([phone]). To learn more about providing age friendly care to your patients consider visiting this web site to learn more. [email or web address].org/Engage/Initiatives/Age-Friendly-Health-Systems/Pages/default.    In general, we encourage patients to follow-up with their primary care provider prior to implementing the recommendations (orders, referrals, med changes, etc) discussed during the visit today. See A/P and patient instructions below.      Visit Note:  Chief Complaint   Patient presents with    Health Risk Assessments For Seniors     [facility] 1 1/2 yr f/u CGA      Patient Name: Vilma Foster  Patient Age; 58 y.o.  Date of Consult: 5/24/2023  Referring Provider: Follow Up  PCP: MICHELLE Gamino    Interdisciplinary Geriatric Consult Provided By:   MICHELLE Rios, MSW, LCSW    Assessment and Plan:   Hearing loss of left ear  See CGA recommendations    Hashimoto's disease  See CGA recommendations    Anxiety and  depression  See CGA recommendations    Psychosis, schizophrenia, simple (HCC)  See CGA recommendations    Memory loss  See CGA recommendations  MoCA 25/30 - no definitive dementia    Poor dentition  Follow up with dental    Bipolar 1 disorder (HCC)  See CGA recommendations    Hyperlipidemia  Continue medical management    Other insomnia  Sleep hygiene recommended    High blood pressure  See CGA recommendations and follow up with PCP      We thank you for taking the time to share during your medical visit with our team of providers at the [facility]. During the medical visit we completed a Comprehensive Geriatric Assessment with a , pharmacist, and nurse practitioner, all specialty trained in Geriatrics.     Below are our Age Friendly recommendations. Our recommendations are shaped using the 4 M’s model of care. In using the 4 M’s we approach care from starting with What Matters most to you.  We then use Mobility, Medications and Mentation to support that. Please note, our recommendations are another point on your health care journey. We hope the time we spent together helps you achieve What Matters most to you.    What Matters     During the visit you shared that you were hoping to address Memory You also shared that family gives you meaning in life and that you are looking forward to upcoming family travel.     We appreciate your openness and honesty with sharing this personal information. In helping you achieve What Matters we know that happiness, safety, support, companionship, symptom control, adequate sleep, and advanced planning can help many of us achieve What Matters. Below are some resources that we discussed in clinic that we think will help you achieve What Matters, especially some information about advance care planning.     Mentation    Mood    During your assessment we felt that your mood may be a playing a role in your health. You are doing a wonderful job managing your  mood disorder. Keep up the good work!  As you work through your treatment plan, please make sure to keep active and doing those things that give you meaning in life. Thinking, memory, mood, and mental health matter! Just like your body changes with age, so can your brain. Ways to support mentation include being engaged in meaningful activities and managing stress and anxiety. Trying new ways to relax and connect may be helpful.    Memory  During your assessment, we noted that your cognition is not definitively affecting your ability to function in daily life although some stress and anxiety may be a factor in occasional forgetfulness. Support from family and friends can be very important as you continue this part of your health care journey. Below are the specific recommendations we discussed during your visit. Please continue to follow up with your provider about any concerns or sudden changes that you or your family note.       Medications  During our assessment, we reviewed your medications to make sure they are supporting What Matters most you. Based on our discussion we thought there may be opportunity to adjust some medications to help you better achieve your health goals. When adjusting medications, we generally like to only make one change at a time. Below are some recommendations for you to discuss with your primary provider.     You are currently taking 3 supplements that contain Vitamin D and you also drink milk regularly. You may consider discontinuing your Vitamin D supplement as Vitamin D is also in your Calcium supplement and Multivitamin.   You may consider discontinuing fish oil supplement as there is not enough evidence to support this having enough benefit. Instead consider increasing intake of fish high in Omega 3 fatty acids, such as salmon.     Mobility   During our assessment we were happy to see how active you are! We encourage you to keep this up. At the [facility] we focus on how  mobility can help you achieve What Matter’s. In general, we recommend all adults be active every day, to the best of their ability.     Other Recommendations  Medical Recommendations:  During your evaluation today, we performed a Edwin Cognitive Assessment. You scored 25/30 which is not indicative of any definitve cognitive impairment. Continue to monitor your symptoms and discuss any concerns with your PCP.   Your blood pressure was elevated today. It was 162/90. We recommend monitoring this at home with a home BP monitor. These available at local drug stores/pharmacies. Discuss this further with your PCP at your next visit.   An MRI may not be warranted at this time but may certainly be discussed with your PCP/medical team.   Make sure to consider having your PCP include the following labs: B12, Thiamine, Thyroid panel, Vitamin D/Calcium.       Social Work Care Plan:  Continue working with you therapist and provider to support your mental health, Consider using meditation and relaxation techniques to help reduce stress, and assist with sleep and general emotional health. Using meditation daily can help to minimize overall stress. There are many online resources and mediation classes in town. There are also free apps you can download on any smart phone such as 'Calm' and 'Headspace'.   Start a modest stretching exercise plan, chair yoga, etc.- start with one activity, competing it one or two times a week for 15 minutes. After one week add a third day of exercise. After three weeks (from the start) increase exercise to 30 minutes if possible. Over time, increase to five days per week for 30 minutes each day (can be broken up throughout the day). Provided a handbook of stretching exercises.  As discussed Medicaid covers dental service and reach out to schedule a dental evaluation, listed below are 2 dental clinics which take Medicaid.   [facility]   [facility]   Consider  going to a local Senior Center and participating in the activities they ofto be arcelia. Most are activities are free of charge. They also provide lunch daily, free of charge with a phone reservation.   [facility] at [address] [place], [place] [address]Richard. [phone].  [email or web address] Hospital[facility].[email or web address]/[email or web address]_[email or web address]_[email or web address]  [facility] Kathleen Sage. [phone].  [email or web address]./rec_home/senior_services/[place]_senior_[place].php   You can upload the completed Advance Medical Directive to [place] [employer] for providers to access; the website is: [email or web address]-services/[email or web address]-lockbox. The Advance Directive Registry is a simple and secure approach to ensure that your medical wishes are followed.  A copy of your advance directive will be kept confidentially and readily available to you and your health care provider, when needed, 24-7..   o Follow-up with updating your Advance Health Care Directive as needed    o If you need help please attend [facility] Advance Directive Workshop. Next scheduled in June 2023 at [facility] Debbie Meeting Room #102. [place] online [email or web address] or by calling [phone].  The Mobile Outreach Safety Team (MOST) can be called in a psychiatric emergency for assistance or a welfare check. MOST is a counselor who rides with a  and together they triage crisis situations and can help transport the person to the appropriate place if needed. Call at [phone]. You can also call [place] [employer] Non- Emergency and request MOST at [phone] or [place] [employer] Non-Emergency and request MOST at [phone].       Referral Recommendations (to be ordered by your primary care provider)  None at this time      To follow up with our recommendation (orders, referrals, med changes, etc) we encourage you to follow with their primary care  "provider before implementing these changes.    History of Present Illness:   Vilma is here by herself for a CGA follow up. She is diagnosed with bipolar disorder, Hashimotos, anxiety and depression and hearing loss  of the left ear. She uses a hearing aid. She lives with her daughter and JESSICA and her grandchildren. One of the grandchidren in the household is 9 years old and is special needs. She reports concerns about her memory and has found that she has needed to use more sticky notes to help her remember her daily tasks/errands. She reports recent hypomanic phases in which she regresses functional skills and has to relearn how to do some tasks such as how to talk and do some home chores (laundry, dishes, etc.).     Since her last visit she got   disability approval. She has increased exercise, continues seeing a counselor, got a hearing aid and started an SSRI.     She was fostered from age [age]. She went to a [facility] home for a year and then was fostered by another older couple at 14 years old. Her birth mother \"was an alcoholic\" and is . She met her periodically.     MEDICATIONS  Manages own medications. Takes medications twice a day. Does not use a pill box. Places all needed meds for the day in a small tray to assure that she does not miss any pills each day. This system has been working well and now rarely forgets to take scheduled medications.     Recently started on Escitalopram 6 weeks ago and feels that it is working well.       History provided by:Patient  Patient is a Fair historian    58 y.o. female with Past Medical History:  2018: Acute psychosis (HCC)      Comment:  2018- resolved. From [facility]:  Diagnosis:  Brief                Psychotic Episode  Assessment:  Vilma Foster is a 53               year old female with no previous psychiatric or known                medical history who presented to [facility] as a transfer from                [facility] ([place], [place]) on " 7/12/18 for                further evaluation of encephalopathy and new bizarre                behaviors over the past 2 weeks. Neurologic and metabolic               work up have be  No date: Depression  No date: Head ache  No date: Hearing loss  No date: Hypertriglyceridemia  No date: Schizophrenia (HCC)      Review of System:   Review of Systems   Constitutional:  Negative for chills and fever.   HENT:  Positive for hearing loss. Negative for tinnitus.    Eyes:  Negative for blurred vision and double vision.   Respiratory:  Negative for cough and hemoptysis.    Cardiovascular:  Negative for chest pain and palpitations.   Skin:  Negative for itching and rash.        Reports wounds on plantar aspect of feet that is being followed by PCP  r/t to Hashimotos   Neurological:  Negative for dizziness, tingling, tremors, weakness and headaches.   Psychiatric/Behavioral:  Positive for depression, hallucinations and memory loss. Negative for substance abuse and suicidal ideas. The patient is nervous/anxious and has insomnia.       Hearing - bilateral hearing aids, working well. Hearing impairment began around 2018  Vision - Good with bifocals, Cataracts  being followed  Appetite - good  Weight Loss - stable  Diet - regular, drinks 1-2 glasses of milk a day  Exercise - Waking almost daily 30 minutes a day  Dysphagia - yes, chronic due to dental alignment problems since youth  Memory -   Fatigue  Sleep  Dental Problems - cracked/broken teeth reported  Constipation - no   Diarrhea - no  Urinary Incontinence - no   Falls  - no   Dizziness/lightheadedness  Mood  - stable  SI - denies  Any personal history of depression/anxiety/psychiatric disorder     Cognitive ROS  Memory concerns - pt indicates she needs more visual cues to remember daily tasks like putting the chicken out to thaw for dinner. Tasks that are not part of her normal routine are easily forgotten.   Advance Directives - yes  History of TBI -   Hallucinations - yes,  last was in 2019. Currently stable  Tremor - no   Rigidity  Gait Changes -   Behavior/personality changes  Alcohol use - no   History of PVD/MI/Stroke - no   FH of dementia - no   Previous labs  Previous Head Imaging - CT and MRI 2018       IADL  Legend:   1- independent  2 - need assistance  3 - unable to complete         Are you still driving? No      Are you able to prepare your own meals and/or cook, need assistance or unable to complete? 2     Are you able to do shopping and errands, need assistance or unable to complete? 2     Are you able to do housekeeping, chores, need assistance or unable to complete? 2     Are you able to do laundry, need assistance or unable to complete? 2     Are you able to manage your medications, need assistance or unable to complete? 1     Are you able to do your own money management, need assistance or unable to complete? 2     Are you able to use the phone, need assistance or unable to use the phone? 1     ADL     Are you independent, need assistance, or unable to bathe yourself? 1     Are you independent, need assistance, or unable to dress yourself? 1     Are you independent, need assistance, or unable to feed yourself? 1     Are you independent, need assistance, or unable to get up out of a chair or off a bed? 1     Are you independent, need assistance, or unable to use the toilet by yourself? 1     Are you aware when you need to use the toilet? Yes      If no, please describe the problem you are experiencing.         Assist Devices: Patient uses:  Cane: No   Walker:No   Wheelchair: No   Amputations:No   Chronic oxygen use: No   CPAP/BIPAP use: No   : Denies any urinary leakage during the last 6 months  If you have a problem with continence, do you wear special garments?          Fall Screening:   Any falls within the last year? No   Any injuries associated with the falls?  -If yes, what injury?   Do you worry about falling?Yes   Do you feel unsteady when standing or walking?  Yes   You have symptoms of lightheadedness or dizziness from lying to standing? No      Any throw rugs on floor? No   Do you have stairs?No   Do you have handrails on all stairs?   Good lighting in all hallways. Yes   Any difficulty hearing? Yes   Wears hearing aids: Yes   Any difficulty with vision? Yes   Last eye exam: 2023         FRAIL SCALE     Fatigue:  How much time during the past 4 weeks did you feel tired:  1=All the time, 2=Most the time, 3=Some of the time, 4=A little of the time,  5=None of the time  Answer: 4  (responses of 1 or 2 are scored as 1 and all others as 0)  SCORE: 0     Resistance:  By yourself and not using aids, do you have any difficulty walking up 10 steps without resting?  1=Yes, 0=No  SCORE: 1     Ambulation:  By yourself and not using aids, do you have any difficulty walking a couple of blocks?  1=Yes, 0=No  SCORE: 0        Loss of Weight over last year:   If noted above, one year ago, how much did you weigh:?  158 Up 3 lbs   (percent change is computed as: weight 1 year ago- current weight/weight 1 year ago. X 100.  (more than 5% weight loss is scored as 1, and less than 5% is 0)     Score: 0     Total Score: 1     Scorin =  Robust Health  1-2=Pre-frail  3-5=Frail     Ask if they have been admitted to the hospital in the past three month: No         Mini Cog Clock-  2/2  Time 1:25         Depression Screen - PHQ- 9   0 = Not at all, 1 = Several days,  2 = More than half the days,  3 = Nearly every day      Little interest or pleasure in doing things? 2  Feeling down, depressed , or hopeless? 0  Trouble falling or staying asleep, or sleeping too much? 3   Feeling tired or having little energy? 2  Poor appetite or overeating?  0  Feeling bad about yourself - or that you are a failure or have let yourself or your family down?  0  Trouble concentrating on things, such as reading the newspaper or watching television? 1  Moving or speaking so slowly that other people could have  noticed.  Or the opposite - being so fidgety or restless that you have been moving around a lot more than usual? 2  Thoughts that you would be better off dead, or of hurting yourself? 0  Patient Health Questionnaire Score:  10     Anxiety Screen/PAULETTE-7 Questionnaire  0 = Not at all, 1 = Several days,  2 = More than half the days,  3 = Nearly every day      Feeling nervous, anxious, or on edge: 2  Not being able to stop or control worryin                        Worrying too much about different things: 0  Trouble relaxin  Being so restless that it's hard to sit still: 1  Becoming easily annoyed or irritable: 1  Feeling afraid as if something awful might happen: 0  Total Score :  6                                                                           Interpretation of PAULETTE 7 Total Score   Score Severity :  0-4 No Anxiety   5-9 Mild Anxiety  10-14 Moderate Anxiety  15-21 Severe Anxiety       I reviewed the patient's MiniCog, PHQ9, GAD7, falls, and frailty screens as documented in the Medical Assistant's note.     [place] Cognitive Assessment (MOCA)     VISUOSPACIAL / EXECUTIVE   Clock Drawin/3  Spatial Drawin/1  Cube Drawin1    NAMING  Naming: 3/3    MEMORY  Memory: First trial;Second trial    ATTENTION  Digits: 2/2  Letters:   Subtraction: 4 or 5/5    LANGUAGE  Repeat Phrases: 12  Fluency: 01    ABSTRACTION  Abstraction: 2/2    DELAYED RECALL  Recall words: 5/5  Category Cue (if applicable):    Multiple Choice Cue (if applicable):     ORIENTATION  Orientation: 6    Add 1 point if less than or equal to 12 yr education level: 0   MOCA TOTAL SCORE:  25 /30  Biomechanical/Visual Limitations (if applicable):       Depression Screening  Little interest or pleasure in doing things?  2 - more than half the days  Feeling down, depressed , or hopeless? 0 - not at all  Trouble falling or staying asleep, or sleeping too much?  3 - nearly every day  Feeling tired or having little energy?  2 - more  than half the days  Poor appetite or overeating?  0 - not at all  Feeling bad about yourself - or that you are a failure or have let yourself or your family down? 0 - not at all  Trouble concentrating on things, such as reading the newspaper or watching television? 1 - several days  Moving or speaking so slowly that other people could have noticed.  Or the opposite - being so fidgety or restless that you have been moving around a lot more than usual?  2 - more than half the days  Thoughts that you would be better off dead, or of hurting yourself?  0 - not at all  Patient Health Questionnaire Score: 10    If depressive symptoms identified deferred to follow up visit unless specifically addressed in assessment and plan.    Interpretation of PHQ-9 Total Score   Score Severity   1-4 No Depression   5-9 Mild Depression   10-14 Moderate Depression   15-19 Moderately Severe Depression   20-27 Severe Depression    Screening for Cognitive Impairment  Three Minute Recall (Banana, Sunrise, Chair)  /3    Dudley clock face with all 12 numbers and set the hands to show 20 past 8.  No    Cognitive concerns identified deferred for follow up unless specifically addressed in assessment and plan.    Fall Risk Assessment  Has the patient had two or more falls in the last year or any fall with injury in the last year?  No    Safety Assessment  Throw rugs on floor.     Handrails on all stairs.     Good lighting in all hallways.     Difficulty hearing.     Patient counseled about all safety risks that were identified.    Functional Assessment ADLs  Are there any barriers preventing you from cooking for yourself or meeting nutritional needs?  Yes.    Are there any barriers preventing you from driving safely or obtaining transportation?  Yes.    Are there any barriers preventing you from using a telephone or calling for help?  No    Are there any barriers preventing you from shopping?  Yes.    Are there any barriers preventing you from taking  care of your own finances?  Yes    Are there any barriers preventing you from managing your medications?  No    Are there any barriers preventing you from showering, bathing or dressing yourself? No    Are you currently engaging in any exercise or physical activity?  No.    What is your perception of your health? Fair    Advance Care Planning  Do you have an Advance Directive, Living Will, Durable Power of , or POLST? Yes  Advance Directive              Health Maintenance Summary            Overdue - COLORECTAL CANCER SCREENING (COLONOSCOPY - Every 10 Years) Overdue - never done      No completion history exists for this topic.              Overdue - MAMMOGRAM (Every 2 Years) Overdue - never done      No completion history exists for this topic.              Overdue - COVID-19 Vaccine (4 - Booster for Moderna series) Overdue since 10/1/2022      08/06/2022  Imm Admin: MODERNA SARS-COV-2 VACCINE (12+)    10/27/2021  Imm Admin: MODERNA SARS-COV-2 VACCINE (12+)    09/29/2021  Imm Admin: MODERNA SARS-COV-2 VACCINE (12+)              CERVICAL CANCER SCREENING (Every 3 Years) Tentatively due on 9/12/2025 09/12/2022  THINPREP PAP WITH HPV    09/11/2018  PAP IG (IMAGE GUIDED)    09/11/2018  THINPREP PAP WITH HPV              IMM DTaP/Tdap/Td Vaccine (2 - Td or Tdap) Next due on 3/4/2030      03/04/2020  Outside Immunization: Tdap              HEPATITIS C SCREENING  Completed      07/11/2018  HEPATITIS PANEL ACUTE(4 COMPONENTS)              IMM ZOSTER VACCINES (Series Information) Completed      08/04/2020  Outside Immunization: Zoster Long (Shingrix)    03/04/2020  Outside Immunization: Zoster Long (Shingrix)              IMM INFLUENZA (Series Information) Completed      08/06/2022  Outside Immunization: Influenza Quad Inj P    10/18/2021  Outside Immunization: Influenza Quad Inj P    08/30/2020  Outside Immunization: Influenza Quad Inj P    02/11/2020  Outside Immunization: Influenza Quad Inj P    09/11/2018   Imm Admin: Influenza Vaccine Quad Inj (Pf)    Only the first 5 history entries have been loaded, but more history exists.              IMM HEP B VACCINE (Series Information) Aged Out      No completion history exists for this topic.              HPV Vaccines (Series Information) Aged Out      No completion history exists for this topic.              IMM MENINGOCOCCAL ACWY VACCINE (Series Information) Aged Out      No completion history exists for this topic.              IMM PNEUMOCOCCAL VACCINE: 0-64 Years (Series Information) Aged Out      No completion history exists for this topic.                    Patient Care Team:  MICHELLE Gamino as PCP - General (Family Medicine)      Social History     Socioeconomic History    Marital status:      Spouse name: Not on file    Number of children: 3    Years of education: Not on file    Highest education level: 11th grade   Occupational History    Not on file   Tobacco Use    Smoking status: Never    Smokeless tobacco: Never   Vaping Use    Vaping Use: Never used   Substance and Sexual Activity    Alcohol use: Not Currently     Comment: Not since 2011- a couple of beers    Drug use: Never    Sexual activity: Not on file   Other Topics Concern    Not on file   Social History Narrative    Not on file     Social Determinants of Health     Financial Resource Strain: Medium Risk (5/24/2023)    Overall Financial Resource Strain (CARDIA)     Difficulty of Paying Living Expenses: Somewhat hard   Food Insecurity: No Food Insecurity (5/24/2023)    Hunger Vital Sign     Worried About Running Out of Food in the Last Year: Never true     Ran Out of Food in the Last Year: Never true   Transportation Needs: No Transportation Needs (5/24/2023)    PRAPARE - Transportation     Lack of Transportation (Medical): No     Lack of Transportation (Non-Medical): No   Physical Activity: Unknown (5/24/2023)    Exercise Vital Sign     Days of Exercise per Week: Not on file      Minutes of Exercise per Session: 10 min   Stress: No Stress Concern Present (5/24/2023)    [employer] [place] [employer]ational Health - Occupational Stress Questionnaire     Feeling of Stress : Only a little   Social Connections: Unknown (5/24/2023)    Social Connection and Isolation Panel [NHANES]     Frequency of Communication with Friends and Family: Three times a week     Frequency of Social Gatherings with Friends and Family: Three times a week     Attends Denominational Services: Not on file     Active Member of Clubs or Organizations: No     Attends Club or Organization Meetings: Never     Marital Status:    Intimate Partner Violence: Not on file   Housing Stability: Unknown (5/24/2023)    Housing Stability Vital Sign     Unable to Pay for Housing in the Last Year: No     Number of Places Lived in the Last Year: Not on file     Unstable Housing in the Last Year: No       Family History   Problem Relation Age of Onset    Lung Disease Mother     Psychiatric Illness Mother     Thyroid Daughter      ALLERGIES  Aripiprazole, Risperidone, and Trazodone      Current Outpatient Medications:     Non Formulary Request, Take 5 mg by mouth every day. Levocetirizine Dihydrochroride 5mg, Disp: , Rfl:     Omega-3 Fatty Acids (FISH OIL) 1000 MG Cap capsule, Take 1,000 mg by mouth every day., Disp: , Rfl:     Cholecalciferol (VITAMIN D3) 250 MCG (86281 UT) Tab, Take  by mouth every day., Disp: , Rfl:     Calcium Carb-Cholecalciferol (CALCIUM 500/D PO), Take  by mouth every day. Vit D 25mcg, Disp: , Rfl:     escitalopram (LEXAPRO) 10 MG Tab, Take 10 mg by mouth every day., Disp: , Rfl:     Multiple Vitamin (MULTIVITAMIN ADULT PO), Take 1 Each by mouth every day., Disp: , Rfl:     QUEtiapine (SEROQUEL) 25 MG Tab, Take 25 mg by mouth at bedtime., Disp: , Rfl:     levothyroxine (SYNTHROID) 75 MCG Tab, Take 75 mcg by mouth Every morning on an empty stomach., Disp: , Rfl:      Physical Exam:   BP (!) 162/90 (BP Location: Left arm,  "Patient Position: Sitting, BP Cuff Size: Adult)   Pulse 65   Temp 36.6 °C (97.8 °F) (Tympanic)   Resp 12   Ht 1.499 m (4' 11\")   Wt 73.3 kg (161 lb 9.6 oz)   LMP 07/02/2011 (Approximate)   SpO2 95%   BMI 32.64 kg/m²   Physical Exam  Vitals and nursing note reviewed.   HENT:      Head: Normocephalic.   Cardiovascular:      Rate and Rhythm: Normal rate and regular rhythm.      Pulses: Normal pulses.   Pulmonary:      Effort: Pulmonary effort is normal. No respiratory distress.   Skin:     General: Skin is warm and dry.   Neurological:      Mental Status: She is alert and oriented to person, place, and time. Mental status is at baseline.   Psychiatric:         Mood and Affect: Mood normal.         Speech: Speech is rapid and pressured.         Behavior: Behavior is cooperative.         Cognition and Memory: Cognition is not impaired. Memory is not impaired.         Judgment: Judgment is not impulsive.           Labs;  CBC:  Lab Results   Component Value Date/Time    WBC 9.3 06/30/2019 01:24 AM    RBC 4.75 06/30/2019 01:24 AM    HEMOGLOBIN 13.7 06/30/2019 01:24 AM    HEMATOCRIT 41.5 06/30/2019 01:24 AM    MCV 87.4 06/30/2019 01:24 AM    MCH 28.8 06/30/2019 01:24 AM    MCHC 33.0 (L) 06/30/2019 01:24 AM    MPV 10.2 06/30/2019 01:24 AM    NEUTSPOLYS 64.60 06/30/2019 01:24 AM    LYMPHOCYTES 27.30 06/30/2019 01:24 AM    MONOCYTES 6.50 06/30/2019 01:24 AM    EOSINOPHILS 0.80 06/30/2019 01:24 AM    BASOPHILS 0.50 06/30/2019 01:24 AM      BMP:   Lab Results   Component Value Date/Time    SODIUM 144 06/30/2019 01:24 AM    POTASSIUM 4.6 06/30/2019 01:24 AM    CHLORIDE 107 06/30/2019 01:24 AM    CO2 26 06/30/2019 01:24 AM    GLUCOSE 187 (H) 06/30/2019 01:24 AM    BUN 16 06/30/2019 01:24 AM    CREATININE 1.09 06/30/2019 01:24 AM      LFT:   Lab Results   Component Value Date/Time    ASTSGOT 21 06/30/2019 01:24 AM    ALTSGPT 15 06/30/2019 01:24 AM    TBILIRUBIN 0.3 06/30/2019 01:24 AM    ALBUMIN 3.7 06/30/2019 01:24 AM    " TOTPROTEIN 7.0 06/30/2019 01:24 AM    ALKPHOSPHAT 89 06/30/2019 01:24 AM      Calcium:   Lab Results   Component Value Date/Time    CALCIUM 9.3 06/30/2019 01:24 AM     VIT D: No results found for: 25HYDROXY  TSH:   Lab Results   Component Value Date/Time    TSHULTRASEN 8.650 (H) 06/30/2019 0124     THYROXINE (T4):   Lab Results   Component Value Date/Time    FREET4 0.92 07/02/2018 0449     A1c:   Lab Results   Component Value Date/Time    HBA1C 5.9 (H) 07/12/2018 0557    AVGLUC 123 07/12/2018 0557     Lipids:   Lab Results   Component Value Date/Time    CHOLSTRLTOT 207 (H) 07/01/2018 08:13 PM    TRIGLYCERIDE 118 07/01/2018 08:13 PM    HDL 44 07/01/2018 08:13 PM     (H) 07/01/2018 08:13 PM     Still need B12, HIV, RPR    Imaging:  Results for orders placed during the hospital encounter of 11/27/18    CT-HEAD W/O    Impression  1.  No acute intracranial abnormality.    Note: Examination is mildly limited due to persistent motion artifacts despite 3 acquisition attempts.      Results for orders placed during the hospital encounter of 07/03/18    MR-BRAIN-WITH & W/O    Impression  MRI of the brain without and with contrast within normal limits.      My total time spent caring for the patient on the day of the encounter was 90 minutes.   This does not include time spent on separately billable procedures/tests.    This note was partially dictated with voice recognition software, for any confusion please do not hesitate to contact me.

## 2023-05-24 NOTE — PATIENT INSTRUCTIONS
We thank you for taking the time to share during your medical visit with our team of providers at the [facility]. During the medical visit we completed a Comprehensive Geriatric Assessment with a , pharmacist, and nurse practitioner, all specialty trained in Geriatrics.     Below are our Age Friendly recommendations. Our recommendations are shaped using the 4 M’s model of care. In using the 4 M’s we approach care from starting with What Matters most to you.  We then use Mobility, Medications and Mentation to support that. Please note, our recommendations are another point on your health care journey. We hope the time we spent together helps you achieve What Matters most to you.    What Matters     During the visit you shared that you were hoping to address Memory You also shared that family gives you meaning in life and that you are looking forward to upcoming family travel.     We appreciate your openness and honesty with sharing this personal information. In helping you achieve What Matters we know that happiness, safety, support, companionship, symptom control, adequate sleep, and advanced planning can help many of us achieve What Matters. Below are some resources that we discussed in clinic that we think will help you achieve What Matters, especially some information about advance care planning.     Mentation    Mood    During your assessment we felt that your mood may be a playing a role in your health. You are doing a wonderful job managing your mood disorder. Keep up the good work!  As you work through your treatment plan, please make sure to keep active and doing those things that give you meaning in life. Thinking, memory, mood, and mental health matter! Just like your body changes with age, so can your brain. Ways to support mentation include being engaged in meaningful activities and managing stress and anxiety. Trying new ways to relax and connect may be  helpful.    Memory  During your assessment, we noted that your cognition is not definitively affecting your ability to function in daily life although some stress and anxiety may be a factor in occasional forgetfulness. Support from family and friends can be very important as you continue this part of your health care journey. Below are the specific recommendations we discussed during your visit. Please continue to follow up with your provider about any concerns or sudden changes that you or your family note.       Medications  During our assessment, we reviewed your medications to make sure they are supporting What Matters most you. Based on our discussion we thought there may be opportunity to adjust some medications to help you better achieve your health goals. When adjusting medications, we generally like to only make one change at a time. Below are some recommendations for you to discuss with your primary provider.     You are currently taking 3 supplements that contain Vitamin D and you also drink milk regularly. You may consider discontinuing your Vitamin D supplement as Vitamin D is also in your Calcium supplement and Multivitamin.   You may consider discontinuing fish oil supplement as there is not enough evidence to support this having enough benefit. Instead consider increasing intake of fish high in Omega 3 fatty acids, such as salmon.     Mobility   During our assessment we were happy to see how active you are! We encourage you to keep this up. At the [facility] we focus on how mobility can help you achieve What Matter’s. In general, we recommend all adults be active every day, to the best of their ability.     Other Recommendations  Medical Recommendations:  During your evaluation today, we performed a [place] Cognitive Assessment. You scored 25/30 which is not indicative of any definitve cognitive impairment. Continue to monitor your symptoms and discuss any concerns with your PCP.   Your blood  pressure was elevated today. It was 162/90. We recommend monitoring this at home with a home BP monitor. These available at local drug stores/pharmacies. Discuss this further with your PCP at your next visit.   An MRI may not be warranted at this time but may certainly be discussed with your PCP/medical team.   Make sure to consider having your PCP include the following labs: B12, Thiamine, Thyroid panel, Vitamin D/Calcium.       Social Work Care Plan:  Continue working with you therapist and provider to support your mental health, Consider using meditation and relaxation techniques to help reduce stress, and assist with sleep and general emotional health. Using meditation daily can help to minimize overall stress. There are many online resources and mediation classes in [facility]. There are also free apps you can download on any smart phone such as 'Calm' and 'Headspace'. You can also search for guided meditations at www.[employer].com.    Start a modest stretching exercise plan, chair yoga, etc.- start with one activity, competing it one or two times a week for 15 minutes. After one week add a third day of exercise. After three weeks (from the start) increase exercise to 30 minutes if possible. Over time, increase to five days per week for 30 minutes each day (can be broken up throughout the day). Provided a handbook of stretching exercises.  As discussed Medicaid covers dental service and reach out to schedule a dental evaluation, listed below are 2 dental clinics which take Medicaid.   [facility]   [facility]   Consider going to a local Senior Center and participating in the activities they ofto be arcelia. Most are activities are free of charge. They also provide lunch daily, free of charge with a phone reservation.   [facility] at [address] [place] [address], [place]. [phone].  [email or web address].gov/seniorsrv/events_calendars_[email or web address]  [facility]  Kathleen Ceja. [phone].  [email or web address]./rec_home/senior_services/[email or web address]_senior_[email or web address]   You can upload the completed Advance Medical Directive to [place] AMD Lockbox for providers to access; the website is: [email or web address].[facility].gov/sos/online-services/[place]-lockbox. The Advance Directive Registry is a simple and secure approach to ensure that your medical wishes are followed.  A copy of your advance directive will be kept confidentially and readily available to you and your health care provider, when needed, 24-7..   o Follow-up with updating your Advance Health Care Directive as needed    o If you need help please attend [facility] Advance Directive Workshop. Next scheduled in June 2023 at [facility] Meeting Room #102. [place] online [email or web address] or by calling [phone].  The Mobile Outreach Safety Team (MOST) can be called in a psychiatric emergency for assistance or a welfare check. MOST is a counselor who rides with a  and together they triage crisis situations and can help transport the person to the appropriate place if needed. Call at ([phone]. You can also call [place] [employer] Non- Emergency and request MOST at [phone] or [place] [employer] Non-Emergency and request MOST at [phone].       Referral Recommendations (to be ordered by your primary care provider)  None at this time      To follow up with our recommendation (orders, referrals, med changes, etc) we encourage you to follow with their primary care provider before implementing these changes.

## 2023-05-24 NOTE — PROGRESS NOTES
"Geriatric Psychosocial Assessment & Care Plan Follow-Up      Previous Comprehensive Geriatric Psychosocial Assessment and Follow-Up Visits  Patient reports concerns as \"Memory, short term memory\"     Are there any concerns you would like to bring to our attention?   Patient reports confusion over MRI and CT in 2019. Patient wants to review recommendation from 2021.     Social Support     Marital Status: Divorce     Children (first name; state where living)  Lives with DTR   Patient stays in contact with her 3 children and several grandchildren. Patient reports social support primarily as family and limited support otherwise.      Living situation:  Has there been a change in your living situation     Patient denies changes, currently living with DTR      Do you feel safe in the neighborhood you live in?  Patient confirms     Do you feel safe in your home?  Patient confirms     Typical day/Sleep/Appetite   Patient reports importance of routine for both herslef and granddaughter who she helps care for. Patient confirms going to bed at 8pm and wake up at 6:30am. Patient reports helping around the house, appointments, and walking as main activities throughout the day.     Driving:  Patient does not drive       Exercise: Patient report open wound permitting ongoing exercise, primary reports exercise as \"walking\".       Social activities and engagement: Patient denies     Do you have Advanced Directives? Yes, copy requested   Agents for DPOAHC DTR   Agents for DPOA Finances DTR     Finances:    Sources of Income: SSDI     Amount of monthly income (household): 847.00   S  Is this sufficient to meet all of your expenses? SNAP, Medicaid       Behavioral Health:  Do you drink any alcohol? Patient denies     Do you use medicinal marijuana or CBD products? Patient denies    Do you see a counselor, therapist, psychologist or psychiatrist? 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 Patient reports \"getting back into my routine\".     Are you currently having any thoughts about taking your own life and committing suicide?   Patient denies     Is there anything you are looking forward to? Do you have any fun trips planned?    Family vacation, [facility]     Social Work Care Plan:   Continue working with you therapist and provider to support your mental health, Consider using meditation and relaxation techniques to help reduce stress, and assist with sleep and general emotional health. Using meditation daily can help to minimize overall stress. There are many online resources and mediation classes in [facility]. There are also free apps you can download on any smart phone such as 'Calm' and 'Headspace'. You can also search for guided meditations at www.[employer].com.     Start a modest stretching exercise plan, chair yoga, etc.- start with one activity, competing it one or two times a week for 15 minutes. After one week add a third day of exercise. After three weeks (from the start) increase exercise to 30 minutes if possible. Over time, increase to five days per week for 30 minutes each day (can be broken up throughout the day). Provided a handbook of stretching exercises.   As discussed Medicaid covers dental service and reach out to schedule a dental evaluation, listed below are 2 dental clinics which take Medicaid.    o [facility]    o [facility]    Consider going to a local Senior Center and participating in the activities they ofto be arcelia. Most are activities are free of charge. They also provide lunch daily, free of charge with a phone reservation.    [facility] at [address]. [phone].   o [email or web address].gov/seniorsrv/events_[email or web address]_[email or web address]   [facility] [address], [place]. [phone].   o [email or web address]./rec_home/senior_services/[place]_senior_[email or web address].php    You can upload the completed Advance Medical Directive to [place] [employer] Lockbox for providers to access; the website is: [email or web address]://www.[facility].gov/sos/online-services/[place]-lockbox. The Advance Directive Registry is a simple and secure approach to ensure that your medical wishes are followed.  A copy of your advance directive will be kept confidentially and readily available to you and your health care provider, when needed, 24-7..    o Follow-up with updating your Advance Health Care Directive as needed     o If you need help please attend [facility] Advance Directive Workshop. Next scheduled in June 2023 at [facility] Meeting Room #102. [place] online [email or web address] or by calling [phone].   The Mobile Outreach Safety Team (MOST) can be called in a psychiatric emergency for assistance or a welfare check. MOST is a counselor who rides with a  and together they triage crisis situations and can help transport the person to the appropriate place if needed. Call at ([phone]) [phone]. You can also call [place] [employer] Non- Emergency and request MOST at [phone] or [place] [employer] Non-Emergency and request MOST at [phone].

## 2023-06-06 PROBLEM — I10 HIGH BLOOD PRESSURE: Status: ACTIVE | Noted: 2023-06-06

## 2023-06-06 PROBLEM — F31.9 BIPOLAR 1 DISORDER (HCC): Status: ACTIVE | Noted: 2023-06-06

## 2023-06-23 ENCOUNTER — TELEPHONE (OUTPATIENT)
Dept: INTERNAL MEDICINE | Facility: OTHER | Age: 58
End: 2023-06-23
Payer: MEDICAID

## 2023-06-23 NOTE — TELEPHONE ENCOUNTER
Jamie f/u [facility] call placed to Vilma who reports the follow up visit went well, she liked it. Reviewed her packet with her therapist. Doesn't fill out surveys. Would like to be on 1 yr f/u list.